# Patient Record
Sex: FEMALE | Race: WHITE | NOT HISPANIC OR LATINO | Employment: FULL TIME | URBAN - NONMETROPOLITAN AREA
[De-identification: names, ages, dates, MRNs, and addresses within clinical notes are randomized per-mention and may not be internally consistent; named-entity substitution may affect disease eponyms.]

---

## 2023-12-21 ENCOUNTER — OFFICE VISIT (OUTPATIENT)
Dept: FAMILY MEDICINE CLINIC | Facility: CLINIC | Age: 53
End: 2023-12-21
Payer: COMMERCIAL

## 2023-12-21 VITALS
WEIGHT: 284 LBS | DIASTOLIC BLOOD PRESSURE: 80 MMHG | HEART RATE: 75 BPM | OXYGEN SATURATION: 100 % | HEIGHT: 70 IN | SYSTOLIC BLOOD PRESSURE: 130 MMHG | BODY MASS INDEX: 40.66 KG/M2

## 2023-12-21 DIAGNOSIS — Z13.220 LIPID SCREENING: ICD-10-CM

## 2023-12-21 DIAGNOSIS — Z13.29 SCREENING FOR THYROID DISORDER: ICD-10-CM

## 2023-12-21 DIAGNOSIS — Z00.00 ENCOUNTER FOR MEDICAL EXAMINATION TO ESTABLISH CARE: Primary | ICD-10-CM

## 2023-12-21 DIAGNOSIS — Z13.1 SCREENING FOR DIABETES MELLITUS: ICD-10-CM

## 2023-12-21 DIAGNOSIS — E66.01 CLASS 3 SEVERE OBESITY DUE TO EXCESS CALORIES WITHOUT SERIOUS COMORBIDITY WITH BODY MASS INDEX (BMI) OF 40.0 TO 44.9 IN ADULT: ICD-10-CM

## 2023-12-21 DIAGNOSIS — Z11.59 NEED FOR HEPATITIS C SCREENING TEST: ICD-10-CM

## 2023-12-21 PROBLEM — E66.813 CLASS 3 SEVERE OBESITY DUE TO EXCESS CALORIES WITHOUT SERIOUS COMORBIDITY WITH BODY MASS INDEX (BMI) OF 40.0 TO 44.9 IN ADULT: Status: ACTIVE | Noted: 2023-12-21

## 2023-12-21 NOTE — PROGRESS NOTES
New Patient Office Visit      Date: 2023   Patient Name: Caity Hammond  : 1970   MRN: [id number]     Chief Complaint:    Chief Complaint   Patient presents with    Weight Loss    Establish Care       History of Present Illness: Caity Hammond is a 53 y.o. female who is here today to establish care.  She has not seen a provider in a long time.  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  She states that she has tried to eat healthy and exercise regularly, but she would like to see the status of her health.  She would also like to discuss weight loss.  She states that she has tried every single diet that she could find, but she only ever found minor success with very extreme diets.  With those, even if she had success, she was unable to maintain the weight loss.  She is states that she has also tried walking for weight loss and health, but she has not seen any improvements with that.  She states that she was as high as 318 pounds, but she has been able to get it down to where she is now.    Subjective      Review of Systems:   Review of Systems   Constitutional:  Positive for unexpected weight gain. Negative for unexpected weight loss.   Psychiatric/Behavioral:  Positive for sleep disturbance and stress. Negative for dysphoric mood and depressed mood. The patient is not nervous/anxious.        Past Medical History: History reviewed. No pertinent past medical history.    Past Surgical History:   Past Surgical History:   Procedure Laterality Date     SECTION      TUBAL ABDOMINAL LIGATION         Family History:   Family History   Problem Relation Age of Onset    Obesity Mother         had RU [place] surgery    Hyperlipidemia Mother     Diabetes Father         passed at 73    Hypertension Father     Alzheimer's disease Father     Hypertension Brother     Obesity Brother     Hyperlipidemia Brother   "   Alzheimer's disease Maternal Grandmother     Prostate cancer Maternal Grandfather     Cancer Paternal Grandfather 80 - 89       Social History:   Social History     Socioeconomic History    Marital status:    Tobacco Use    Smoking status: Never    Smokeless tobacco: Never   Vaping Use    Vaping Use: Never used   Substance and Sexual Activity    Drug use: Never    Sexual activity: Yes       Medications:     Current Outpatient Medications:     doxylamine (UNISOM) 25 MG tablet, Take 1 tablet by mouth At Night As Needed for Sleep., Disp: , Rfl:     Allergies:   No Known Allergies    Objective     Physical Exam:  Vital Signs:   Vitals:    12/21/23 1459   BP: 130/80   BP Location: Right arm   Patient Position: Sitting   Cuff Size: Large Adult   Pulse: 75   SpO2: 100%   Weight: 129 kg (284 lb)   Height: 177.8 cm (70\")     Body mass index is 40.75 kg/m².    Physical Exam  Vitals and nursing note reviewed.   Constitutional:       General: She is not in acute distress.     Appearance: Normal appearance. She is obese. She is not ill-appearing.   HENT:      Head: Normocephalic and atraumatic.   Cardiovascular:      Rate and Rhythm: Normal rate and regular rhythm.      Pulses: Normal pulses.      Heart sounds: Normal heart sounds.   Pulmonary:      Effort: Pulmonary effort is normal. No respiratory distress.      Breath sounds: Normal breath sounds.   Musculoskeletal:      Right lower leg: No edema.      Left lower leg: No edema.   Skin:     General: Skin is warm and dry.   Neurological:      General: No focal deficit present.      Mental Status: She is alert and oriented to person, place, and time.      Coordination: Coordination normal.      Gait: Gait normal.   Psychiatric:         Mood and Affect: Mood normal.         Behavior: Behavior normal.       Results:   PHQ-9 Total Score: 0      Assessment / Plan      Assessment/Plan:   Diagnoses and all orders for this visit:    1. Encounter for medical examination to " establish care (Primary)    2. Class 3 severe obesity due to excess calories without serious comorbidity with body mass index (BMI) of 40.0 to 44.9 in adult  Assessment & Plan:  Patient's (Body mass index is 40.75 kg/m².) indicates that they are morbidly/severely obese (BMI > 40 or > 35 with obesity - related health condition) with health conditions that include none . Weight is improving with lifestyle modifications. BMI  is above average; BMI management plan is completed. We discussed portion control, increasing exercise, and checking blood work to see if there other potential factors .     Orders:  -     Comprehensive Metabolic Panel; Future  -     CBC Auto Differential; Future  -     Magnesium; Future    3. Need for hepatitis C screening test  -     HCV Antibody Rfx To Qnt PCR; Future    4. Screening for thyroid disorder  -     Thyroid Cascade Profile; Future    5. Screening for diabetes mellitus  -     Hemoglobin A1c; Future    6. Lipid screening  -     Lipid Panel; Future         Follow Up:   Return for Annual Physical in 6 weeks.    Coleen Chu PA-C  [facility] Internal Medicine [facility]

## 2023-12-22 NOTE — ASSESSMENT & PLAN NOTE
Patient's (Body mass index is 40.75 kg/m².) indicates that they are morbidly/severely obese (BMI > 40 or > 35 with obesity - related health condition) with health conditions that include none . Weight is improving with lifestyle modifications. BMI  is above average; BMI management plan is completed. We discussed portion control, increasing exercise, and checking blood work to see if there other potential factors .

## 2024-01-04 ENCOUNTER — LAB (OUTPATIENT)
Dept: FAMILY MEDICINE CLINIC | Facility: CLINIC | Age: 54
End: 2024-01-04
Payer: COMMERCIAL

## 2024-01-04 DIAGNOSIS — Z13.220 LIPID SCREENING: ICD-10-CM

## 2024-01-04 DIAGNOSIS — Z13.29 SCREENING FOR THYROID DISORDER: ICD-10-CM

## 2024-01-04 DIAGNOSIS — Z11.59 NEED FOR HEPATITIS C SCREENING TEST: ICD-10-CM

## 2024-01-04 DIAGNOSIS — E66.01 CLASS 3 SEVERE OBESITY DUE TO EXCESS CALORIES WITHOUT SERIOUS COMORBIDITY WITH BODY MASS INDEX (BMI) OF 40.0 TO 44.9 IN ADULT: ICD-10-CM

## 2024-01-04 DIAGNOSIS — Z13.1 SCREENING FOR DIABETES MELLITUS: ICD-10-CM

## 2024-01-04 PROCEDURE — 36415 COLL VENOUS BLD VENIPUNCTURE: CPT | Performed by: PHYSICIAN ASSISTANT

## 2024-01-05 LAB
ALBUMIN SERPL-MCNC: 4.5 G/DL (ref 3.8–4.9)
ALBUMIN/GLOB SERPL: 1.7 {RATIO} (ref 1.2–2.2)
ALP SERPL-CCNC: 80 IU/L (ref 44–121)
ALT SERPL-CCNC: 25 IU/L (ref 0–32)
AST SERPL-CCNC: 23 IU/L (ref 0–40)
BASOPHILS # BLD AUTO: 0 X10E3/UL (ref 0–0.2)
BASOPHILS NFR BLD AUTO: 1 %
BILIRUB SERPL-MCNC: 0.6 MG/DL (ref 0–1.2)
BUN SERPL-MCNC: 15 MG/DL (ref 6–24)
BUN/CREAT SERPL: 24 (ref 9–23)
CALCIUM SERPL-MCNC: 9.9 MG/DL (ref 8.7–10.2)
CHLORIDE SERPL-SCNC: 102 MMOL/L (ref 96–106)
CHOLEST SERPL-MCNC: 261 MG/DL (ref 100–199)
CO2 SERPL-SCNC: 23 MMOL/L (ref 20–29)
CREAT SERPL-MCNC: 0.63 MG/DL (ref 0.57–1)
EGFRCR SERPLBLD CKD-EPI 2021: 107 ML/MIN/1.73
EOSINOPHIL # BLD AUTO: 0.1 X10E3/UL (ref 0–0.4)
EOSINOPHIL NFR BLD AUTO: 1 %
ERYTHROCYTE [DISTWIDTH] IN BLOOD BY AUTOMATED COUNT: 14.5 % (ref 11.7–15.4)
GLOBULIN SER CALC-MCNC: 2.6 G/DL (ref 1.5–4.5)
GLUCOSE SERPL-MCNC: 99 MG/DL (ref 70–99)
HBA1C MFR BLD: 5.9 % (ref 4.8–5.6)
HCT VFR BLD AUTO: 43.6 % (ref 34–46.6)
HCV AB SERPL QL IA: NORMAL
HCV IGG SERPL QL IA: NON REACTIVE
HDLC SERPL-MCNC: 71 MG/DL
HGB BLD-MCNC: 13.8 G/DL (ref 11.1–15.9)
IMM GRANULOCYTES # BLD AUTO: 0 X10E3/UL (ref 0–0.1)
IMM GRANULOCYTES NFR BLD AUTO: 0 %
LDLC SERPL CALC-MCNC: 172 MG/DL (ref 0–99)
LYMPHOCYTES # BLD AUTO: 1.7 X10E3/UL (ref 0.7–3.1)
LYMPHOCYTES NFR BLD AUTO: 29 %
MAGNESIUM SERPL-MCNC: 2.4 MG/DL (ref 1.6–2.3)
MCH RBC QN AUTO: 26.2 PG (ref 26.6–33)
MCHC RBC AUTO-ENTMCNC: 31.7 G/DL (ref 31.5–35.7)
MCV RBC AUTO: 83 FL (ref 79–97)
MONOCYTES # BLD AUTO: 0.5 X10E3/UL (ref 0.1–0.9)
MONOCYTES NFR BLD AUTO: 8 %
NEUTROPHILS # BLD AUTO: 3.6 X10E3/UL (ref 1.4–7)
NEUTROPHILS NFR BLD AUTO: 61 %
PLATELET # BLD AUTO: 272 X10E3/UL (ref 150–450)
POTASSIUM SERPL-SCNC: 4.7 MMOL/L (ref 3.5–5.2)
PROT SERPL-MCNC: 7.1 G/DL (ref 6–8.5)
RBC # BLD AUTO: 5.26 X10E6/UL (ref 3.77–5.28)
SODIUM SERPL-SCNC: 139 MMOL/L (ref 134–144)
TRIGL SERPL-MCNC: 104 MG/DL (ref 0–149)
TSH SERPL DL<=0.005 MIU/L-ACNC: 2.61 UIU/ML (ref 0.45–4.5)
VLDLC SERPL CALC-MCNC: 18 MG/DL (ref 5–40)
WBC # BLD AUTO: 5.9 X10E3/UL (ref 3.4–10.8)

## 2024-01-26 ENCOUNTER — TELEPHONE (OUTPATIENT)
Dept: FAMILY MEDICINE CLINIC | Facility: CLINIC | Age: 54
End: 2024-01-26
Payer: COMMERCIAL

## 2024-01-26 NOTE — TELEPHONE ENCOUNTER
Called/spoke to patient and let her know that we would not be able to transfer care to anyone until the new provider started.    She verbalized understanding and will keep her appt for next week.

## 2024-01-26 NOTE — TELEPHONE ENCOUNTER
Caller: Caity Hammond    Relationship: Self    Best call back number: [phone]    Who is your current provider: FELICITY HERNANDEZ    Who would you like your new provider to be: A DOCTOR AND NURSE PRACTITIONER     What are your reasons for transferring care: PT RATHER HAVE PCP THAT IS MORE EXPERIENCED

## 2024-02-01 ENCOUNTER — OFFICE VISIT (OUTPATIENT)
Dept: FAMILY MEDICINE CLINIC | Facility: CLINIC | Age: 54
End: 2024-02-01
Payer: COMMERCIAL

## 2024-02-01 VITALS
OXYGEN SATURATION: 97 % | DIASTOLIC BLOOD PRESSURE: 80 MMHG | BODY MASS INDEX: 41.59 KG/M2 | SYSTOLIC BLOOD PRESSURE: 130 MMHG | WEIGHT: 290.5 LBS | HEART RATE: 77 BPM | HEIGHT: 70 IN

## 2024-02-01 DIAGNOSIS — Z12.31 ENCOUNTER FOR SCREENING MAMMOGRAM FOR MALIGNANT NEOPLASM OF BREAST: ICD-10-CM

## 2024-02-01 DIAGNOSIS — Z12.11 ENCOUNTER FOR SCREENING FOR MALIGNANT NEOPLASM OF COLON: ICD-10-CM

## 2024-02-01 DIAGNOSIS — E66.01 CLASS 3 SEVERE OBESITY DUE TO EXCESS CALORIES WITH SERIOUS COMORBIDITY AND BODY MASS INDEX (BMI) OF 40.0 TO 44.9 IN ADULT: ICD-10-CM

## 2024-02-01 DIAGNOSIS — Z00.00 GENERAL MEDICAL EXAM: Primary | ICD-10-CM

## 2024-02-01 DIAGNOSIS — E78.2 MIXED HYPERLIPIDEMIA: ICD-10-CM

## 2024-02-01 DIAGNOSIS — R73.03 PREDIABETES: ICD-10-CM

## 2024-02-01 NOTE — PROGRESS NOTES
Female Physical Note      Date: 2024   Patient Name: Caity Hammond  : 1970   MRN: [id number]     Chief Complaint:    Chief Complaint   Patient presents with    Annual Exam       History of Present Illness: Caity Hammond is a 53 y.o. female who is here today for their annual health maintenance and physical.  She continues to be concerned about her weight, and she has still been unable to lose despite lifestyle changes.      Subjective      Review of Systems:   Review of Systems   Constitutional:  Positive for unexpected weight gain. Negative for unexpected weight loss.   Respiratory:  Negative for cough, chest tightness and shortness of breath.    Cardiovascular:  Negative for chest pain, palpitations and leg swelling.   Gastrointestinal:  Negative for abdominal pain, blood in stool, constipation, diarrhea, nausea, vomiting and indigestion.   Genitourinary:  Positive for amenorrhea (Almost 1 year).   Skin: Negative.    Psychiatric/Behavioral:  Positive for sleep disturbance and stress. Negative for dysphoric mood and depressed mood. The patient is not nervous/anxious.        Past Medical History, Social History, Family History and Care Team were all reviewed with patient and updated as appropriate.     Medications:     Current Outpatient Medications:     doxylamine (UNISOM) 25 MG tablet, Take 1 tablet by mouth At Night As Needed for Sleep., Disp: , Rfl:     Allergies:   No Known Allergies    Health Maintenance   Topic Date Due    MAMMOGRAM  Never done    COLORECTAL CANCER SCREENING  Never done    ANNUAL PHYSICAL  Never done    PAP SMEAR  Never done    ZOSTER VACCINE (1 of 2) 2024 (Originally 3/24/2021)    COVID-19 Vaccine (1) 2024 (Originally 1970)    INFLUENZA VACCINE  2024 (Originally 2023)    TDAP/TD VACCINES (1 - Tdap) 2025 (Originally 3/24/1990)    LIPID PANEL  2025    BMI FOLLOWUP  2025    HEPATITIS C SCREENING  Completed    Pneumococcal Vaccine  "0-64  Aged Out       Diet/Physical activity:  She has been eating salads for lunch, and she is prepping her foods as much as possible. She has been trying to limit carbs, and she has not had any snacks in the house.     She is currently exercising walking 30-45 minutes/day 3 days/week. She states active the other days a week.     Sexual Health: Denies concerns.     Intimate partner violence: Denies concerns.    Objective     Physical Exam:  Vital Signs:   Vitals:    02/01/24 1414   BP: 130/80   BP Location: Right arm   Patient Position: Sitting   Cuff Size: Large Adult   Pulse: 77   SpO2: 97%   Weight: 132 kg (290 lb 8 oz)   Height: 177.8 cm (70\")     Body mass index is 41.68 kg/m².     Physical Exam  Vitals and nursing note reviewed.   Constitutional:       General: She is not in acute distress.     Appearance: Normal appearance. She is obese. She is not ill-appearing.   HENT:      Head: Normocephalic and atraumatic.   Cardiovascular:      Rate and Rhythm: Normal rate and regular rhythm.      Pulses: Normal pulses.      Heart sounds: Normal heart sounds.   Pulmonary:      Effort: Pulmonary effort is normal. No respiratory distress.      Breath sounds: Normal breath sounds.   Musculoskeletal:      Right lower leg: No edema.      Left lower leg: No edema.   Skin:     General: Skin is warm and dry.   Neurological:      General: No focal deficit present.      Mental Status: She is alert and oriented to person, place, and time.      Coordination: Coordination normal.      Gait: Gait normal.   Psychiatric:         Mood and Affect: Mood normal.         Behavior: Behavior normal.       Assessment / Plan      Assessment/Plan:   Diagnoses and all orders for this visit:    1. General medical exam (Primary)  Comments:  She declines any immunizations.  Pap smear recommended, and she agrees to schedule herself.    2. Class 3 severe obesity due to excess calories with serious comorbidity and body mass index (BMI) of 40.0 to 44.9 " in adult  Assessment & Plan:  Patient's (Body mass index is 41.68 kg/m².) indicates that they are morbidly/severely obese (BMI > 40 or > 35 with obesity - related health condition) with health conditions that include dyslipidemias and prediabetes  . Weight is worsening. BMI  is above average; BMI management plan is completed. We discussed low calorie, low carb based diet program, portion control, increasing exercise, and pharmacologic options including injectables .  She will start program with her insurance for nutritional counseling, and we will initiate preferred medication.  We discussed the options during today's visit.      3. Mixed hyperlipidemia  Assessment & Plan:  Hyperlipidemia is chronic and not currently controlled.  Her HDL is even better than recommended, and her VLDL is very well-controlled.  She declines to take any statin therapy, so she is advised to continue with diet improvement. I would recommend a diet low in saturated fats to help decrease the elevated LDL cholesterol.  We will continue to monitor.      4. Prediabetes  Assessment & Plan:  Her hemoglobin A1c was in the prediabetic range on recent labs.  I recommended a diet low in sugar, carbohydrates, and saturated fats.  Increased exercise and decreased weight can help reduce chance of diabetes.  We will continue to monitor.      5. Encounter for screening for malignant neoplasm of colon  Comments:  She declines colonoscopy, but she is in agreement to do a Cologuard.  Orders:  -     Cologuard - Stool, Per Rectum; Future    6. Encounter for screening mammogram for malignant neoplasm of breast  Comments:  She has history of abnormal mammogram, but she has never had any malignancies.  Orders:  -     Mammo Screening Bilateral With CAD; Future        Follow Up:   Return in about 6 weeks (around 3/14/2024) for recheck.    Healthcare Maintenance:   Discussed injury prevention, diet and exercise, safe sexual practices, and screening for common  diseases. Encouraged use of sunscreen and seatbelts. Encouraged SBE, avoidance of tobacco, limiting alcohol, and yearly dental and eye exams.   Caity Hammond voices understanding and acceptance of this advice and will call back with any further questions or concerns. AVS with preventive healthcare tips printed for patient.     Coleen Chu PA-C  [facility] Internal Medicine [facility]

## 2024-02-02 NOTE — ASSESSMENT & PLAN NOTE
Her hemoglobin A1c was in the prediabetic range on recent labs.  I recommended a diet low in sugar, carbohydrates, and saturated fats.  Increased exercise and decreased weight can help reduce chance of diabetes.  We will continue to monitor.

## 2024-02-02 NOTE — ASSESSMENT & PLAN NOTE
Hyperlipidemia is chronic and not currently controlled.  Her HDL is even better than recommended, and her VLDL is very well-controlled.  She declines to take any statin therapy, so she is advised to continue with diet improvement. I would recommend a diet low in saturated fats to help decrease the elevated LDL cholesterol.  We will continue to monitor.

## 2024-02-02 NOTE — ASSESSMENT & PLAN NOTE
Patient's (Body mass index is 41.68 kg/m².) indicates that they are morbidly/severely obese (BMI > 40 or > 35 with obesity - related health condition) with health conditions that include dyslipidemias and prediabetes  . Weight is worsening. BMI  is above average; BMI management plan is completed. We discussed low calorie, low carb based diet program, portion control, increasing exercise, and pharmacologic options including injectables .  She will start program with her insurance for nutritional counseling, and we will initiate preferred medication.  We discussed the options during today's visit.

## 2024-03-18 ENCOUNTER — TRANSCRIBE ORDERS (OUTPATIENT)
Dept: ADMINISTRATIVE | Facility: HOSPITAL | Age: 54
End: 2024-03-18
Payer: COMMERCIAL

## 2024-03-18 DIAGNOSIS — Z12.31 VISIT FOR SCREENING MAMMOGRAM: Primary | ICD-10-CM

## 2024-04-05 ENCOUNTER — HOSPITAL ENCOUNTER (OUTPATIENT)
Dept: MAMMOGRAPHY | Facility: HOSPITAL | Age: 54
Discharge: HOME | End: 2024-04-05
Admitting: PHYSICIAN ASSISTANT
Payer: COMMERCIAL

## 2024-04-05 DIAGNOSIS — Z12.31 VISIT FOR SCREENING MAMMOGRAM: ICD-10-CM

## 2024-04-05 PROCEDURE — 77063 BREAST TOMOSYNTHESIS BI: CPT

## 2024-04-05 PROCEDURE — 77067 SCR MAMMO BI INCL CAD: CPT

## 2024-04-09 DIAGNOSIS — R92.8 ABNORMAL MAMMOGRAM: Primary | ICD-10-CM

## 2024-05-13 ENCOUNTER — HOSPITAL ENCOUNTER (OUTPATIENT)
Dept: ULTRASOUND IMAGING | Facility: HOSPITAL | Age: 54
Discharge: HOME | End: 2024-05-13
Payer: COMMERCIAL

## 2024-05-13 ENCOUNTER — HOSPITAL ENCOUNTER (OUTPATIENT)
Dept: MAMMOGRAPHY | Facility: HOSPITAL | Age: 54
Discharge: HOME | End: 2024-05-13
Payer: COMMERCIAL

## 2024-05-13 ENCOUNTER — TRANSCRIBE ORDERS (OUTPATIENT)
Dept: ADMINISTRATIVE | Facility: HOSPITAL | Age: 54
End: 2024-05-13
Payer: COMMERCIAL

## 2024-05-13 DIAGNOSIS — R92.8 ABNORMAL MAMMOGRAM: Primary | ICD-10-CM

## 2024-05-13 DIAGNOSIS — R92.8 ABNORMAL MAMMOGRAM: ICD-10-CM

## 2024-05-13 PROCEDURE — 76642 ULTRASOUND BREAST LIMITED: CPT

## 2024-05-13 PROCEDURE — 77061 BREAST TOMOSYNTHESIS UNI: CPT | Performed by: RADIOLOGY

## 2024-05-13 PROCEDURE — 77065 DX MAMMO INCL CAD UNI: CPT | Performed by: RADIOLOGY

## 2024-05-13 PROCEDURE — 76642 ULTRASOUND BREAST LIMITED: CPT | Performed by: RADIOLOGY

## 2024-05-13 PROCEDURE — G0279 TOMOSYNTHESIS, MAMMO: HCPCS

## 2024-05-13 PROCEDURE — 77065 DX MAMMO INCL CAD UNI: CPT

## 2024-05-24 ENCOUNTER — TELEPHONE (OUTPATIENT)
Dept: FAMILY MEDICINE CLINIC | Facility: CLINIC | Age: 54
End: 2024-05-24

## 2024-05-24 NOTE — TELEPHONE ENCOUNTER
Caller: Caity Hammond    Relationship: Self    Best call back number: [phone]     Who is your current provider: RAVINDER HERNANDEZ    Is your current provider offboarding? NO    Who would you like your new provider to be: DR WEISS    What are your reasons for transferring care: PT IS WANTING TO SEE A PHYSICIAN DUE TO SOME CURRENT FINDING WITH HER HEALTH.

## 2024-05-30 ENCOUNTER — OFFICE VISIT (OUTPATIENT)
Dept: FAMILY MEDICINE CLINIC | Facility: CLINIC | Age: 54
End: 2024-05-30
Payer: COMMERCIAL

## 2024-05-30 VITALS
WEIGHT: 293 LBS | DIASTOLIC BLOOD PRESSURE: 78 MMHG | OXYGEN SATURATION: 98 % | SYSTOLIC BLOOD PRESSURE: 126 MMHG | HEIGHT: 70 IN | BODY MASS INDEX: 41.95 KG/M2 | HEART RATE: 78 BPM

## 2024-05-30 DIAGNOSIS — E66.01 CLASS 3 SEVERE OBESITY DUE TO EXCESS CALORIES WITHOUT SERIOUS COMORBIDITY WITH BODY MASS INDEX (BMI) OF 40.0 TO 44.9 IN ADULT: Chronic | ICD-10-CM

## 2024-05-30 DIAGNOSIS — R73.03 PREDIABETES: Chronic | ICD-10-CM

## 2024-05-30 DIAGNOSIS — E78.2 MIXED HYPERLIPIDEMIA: Chronic | ICD-10-CM

## 2024-05-30 DIAGNOSIS — Z00.00 ANNUAL PHYSICAL EXAM: Primary | ICD-10-CM

## 2024-05-30 DIAGNOSIS — L98.9 SKIN LESION OF LEFT LEG: ICD-10-CM

## 2024-05-30 PROCEDURE — 99396 PREV VISIT EST AGE 40-64: CPT | Performed by: NURSE PRACTITIONER

## 2024-05-30 PROCEDURE — 99214 OFFICE O/P EST MOD 30 MIN: CPT | Performed by: NURSE PRACTITIONER

## 2024-05-30 NOTE — ASSESSMENT & PLAN NOTE
Patient reports she has a smooth, raised lesion on her lower left leg that has been darkening and growing in size for years.  She denies itching, burning, etc.  Reports that she can shave over it without it hurting or irritating it.  She has no other areas like this on her body.  We discussed a referral to dermatology for further evaluation, she declines at this time.  Reports that she just wanted it noted in her chart for future potential referrals.

## 2024-05-30 NOTE — PROGRESS NOTES
Annual Physical     Name: Caity Hammond    : 1970     MRN: [id number]     Chief Complaint  Establish Care (Pt is here to establish care), Weight Loss (Pt is wanting to discuss weight loss surgery ), and Abrasion (Pt has a circular spot on her left lower leg. Has been there for years but has started to change color and grow )    Subjective     History of Present Illness:  Caity Hammond is a 54 y.o. female who presents today for a health maintenance evaluation.    Social History  Tobacco Use: Low Risk  (2024)    Patient History     Smoking Tobacco Use: Never     Smokeless Tobacco Use: Never     Passive Exposure: Not on file     Social History     Socioeconomic History    Marital status:    Tobacco Use    Smoking status: Never    Smokeless tobacco: Never   Vaping Use    Vaping status: Never Used   Substance and Sexual Activity    Alcohol use: Never    Drug use: Never    Sexual activity: Yes     Partners: Male     Birth control/protection: None     Comment:  only       General History  Caity  does have regular dental visits. Last exam was in Dec 2023  She does not complain of vision problems. Last eye exam was in . Reports she got new bifocals prescription  Immunizations are not up to date. The patient needs the following immunizations: Zoster and COVID - patient declines these    Lifestyle  Caity  consumes  a combination of healthy and unhealthy foods .  She exercises never.    Reproductive Health  Caity  is postmenopausal.  She reports periods are nonexistent.  She is sexually active. Her contraceptive plan is no method.    Screening  Last pap was ?? - scheduled with GYN in July  Last Completed Pap Smear       This patient has no relevant Health Maintenance data.        . History of abnormal pap smear or family history of gyn cancer: none    Last mammogram was   Last Completed Mammogram            MAMMOGRAM (Every 2 Years) Next due on 2024  Mammo  Diagnostic Digital Tomosynthesis Right With CAD    04/05/2024  Mammo Screening Digital Tomosynthesis Bilateral With CAD                . Personal or family history of abnormal mammograms or breast cancer: none    Last colonoscopy was   Last Completed Colonoscopy            COLORECTAL CANCER SCREENING (COLOGUARD - Every 3 Years) Next due on 2/13/2027 02/13/2024  Cologuard component of Cologuard - Stool, Per Rectum                . Family history of colon cancer: paternal grandfather with colon cancer    **Maternal grandfather passed away with stage 4 prostate cancer    Last DEXA was never.    Health Maintenance Summary            Overdue - PAP SMEAR (Every 3 Years) Never done      No completion, postpone, or frequency change history exists for this topic.              Postponed - ZOSTER VACCINE (1 of 2) Postponed until 6/2/2024 06/02/2024  Postponed until 6/2/2024 by Radha Madden APRN (Patient Refused)    02/01/2024  Postponed until 2/1/2024 by Coleen Chu PA-C (Patient Refused)              Postponed - COVID-19 Vaccine (1 - 2023-24 season) Postponed until 6/4/2024 06/02/2024  Postponed until 6/4/2024 by Radha Madden APRN (Patient Refused)    02/01/2024  Postponed until 2/3/2024 by Coleen Chu PA-C (Patient Refused)              Postponed - TDAP/TD VACCINES (1 - Tdap) Postponed until 2/1/2025 02/01/2024  Postponed until 2/1/2025 by Coleen Chu PA-C (Patient Refused)              INFLUENZA VACCINE (Yearly - August to March) Next due on 8/1/2024 12/21/2023  Postponed until 3/31/2024 by Zohra Hermosillo MA (Patient Refused)              LIPID PANEL (Yearly) Next due on 1/4/2025 01/04/2024  Lipid Panel              ANNUAL PHYSICAL (Yearly) Next due on 2/1/2025 02/01/2024  Registry Metric: Last Annual Physical              BMI FOLLOWUP (Yearly) Next due on 5/30/2025 05/30/2024  SmartData: WORKFLOW - QUALITY  "MEASUREMENT - DOCUMENTED WEIGHT FOLLOW-UP PLAN    05/30/2024  SmartData: WORKFLOW - QUALITY MEASUREMENT - BMI FOLLOW UP CARE PLAN DOCUMENTED    02/01/2024  SmartData: WORKFLOW - QUALITY MEASUREMENT - DOCUMENTED WEIGHT FOLLOW-UP PLAN    02/01/2024  SmartData: WORKFLOW - QUALITY MEASUREMENT - BMI FOLLOW UP CARE PLAN DOCUMENTED    12/21/2023  SmartData: WORKFLOW - QUALITY MEASUREMENT - DOCUMENTED WEIGHT FOLLOW-UP PLAN    Only the first 5 history entries have been loaded, but more history exists.              MAMMOGRAM (Every 2 Years) Next due on 5/13/2026 05/13/2024  Mammo Diagnostic Digital Tomosynthesis Right With CAD    04/05/2024  Mammo Screening Digital Tomosynthesis Bilateral With CAD              COLORECTAL CANCER SCREENING (COLOGUARD - Every 3 Years) Next due on 2/13/2027 02/13/2024  Cologuard component of Cologuard - Stool, Per Rectum              HEPATITIS C SCREENING  Completed      01/04/2024  HCV Antibody Rfx To Qnt PCR              Pneumococcal Vaccine 0-64 (Series Information) Aged Out      No completion, postpone, or frequency change history exists for this topic.                    There is no immunization history on file for this patient.    Review of Systems    Objective     Vital Signs  /78 (BP Location: Left arm, Patient Position: Sitting)   Pulse 78   Ht 177.8 cm (70\")   Wt (!) 138 kg (304 lb 9.6 oz)   SpO2 98%   BMI 43.71 kg/m²   Estimated body mass index is 43.71 kg/m² as calculated from the following:    Height as of this encounter: 177.8 cm (70\").    Weight as of this encounter: 138 kg (304 lb 9.6 oz).        Physical Exam  Vitals reviewed.   Constitutional:       Appearance: Normal appearance.   Cardiovascular:      Rate and Rhythm: Normal rate and regular rhythm.      Heart sounds: Normal heart sounds.   Pulmonary:      Effort: Pulmonary effort is normal.      Breath sounds: Normal breath sounds.   Skin:     General: Skin is warm and dry.      Capillary Refill: Capillary " refill takes less than 2 seconds.      Findings: Lesion (Slightly raised, pigmented slightly darker than skin tone, does not andrew, smooth, anterior left lower leg.) present.          Neurological:      General: No focal deficit present.      Mental Status: She is alert and oriented to person, place, and time.   Psychiatric:         Mood and Affect: Mood normal.         Behavior: Behavior normal.          Assessment and Plan     Diagnoses and all orders for this visit:    1. Annual physical exam (Primary)  Assessment & Plan:  Overall doing well  , 2 young-adult children  Works at [place] [employer]    Patient is transferring care from her previous PCP.  She had labs drawn in January, would like to hold off on having labs drawn today.  Her cholesterol levels were only slightly abnormal, she is not on any medications.  She has been diagnosed with prediabetes in the past, she does try to watch her diet for carbs.      2. Class 3 severe obesity due to excess calories without serious comorbidity with body mass index (BMI) of 40.0 to 44.9 in adult  Assessment & Plan:  Patient's (Body mass index is 43.71 kg/m².) indicates that they are morbidly/severely obese (BMI > 40 or > 35 with obesity - related health condition) with health conditions that include dyslipidemias and prediabetes  . Weight is worsening. BMI  is above average; BMI management plan is completed. We discussed low calorie, low carb based diet program, portion control, increasing exercise, and consulting a Bariatric surgeon per patient's request.    Patient reports she has yoyoed within a 50 pound range for years.  She has taken multiple different medications to assist with weight loss, she always gains her weight back when she stops what ever intervention she is doing.  She has considered weight loss surgery for a while now, has been looking into and reading up about this.  Her mother had Pao-en-Y surgery 8 years ago, is doing well.  Her mother has  encouraged her to seek bariatric surgery, her  is very leery of this at this time.  We discussed risks, benefits, potential side effects.  We discussed the need for a supportive family unit.  Patient is understanding of all of these things, she would like to move forward with referral to bariatric surgery.  Referral order placed today.    Orders:  -     Ambulatory Referral to Bariatric Surgery    3. Prediabetes  Assessment & Plan:  Patient had labs drawn by previous PCP in January of this year, she is not currently on any medications.    Orders:  -     Ambulatory Referral to Bariatric Surgery    4. Mixed hyperlipidemia  Assessment & Plan:  Patient had labs drawn by previous PCP in January of this year, she is not currently on any medications.    Orders:  -     Ambulatory Referral to Bariatric Surgery    5. Skin lesion of left leg  Assessment & Plan:  Patient reports she has a smooth, raised lesion on her lower left leg that has been darkening and growing in size for years.  She denies itching, burning, etc.  Reports that she can shave over it without it hurting or irritating it.  She has no other areas like this on her body.  We discussed a referral to dermatology for further evaluation, she declines at this time.  Reports that she just wanted it noted in her chart for future potential referrals.          Follow Up  Return in about 1 year (around 5/30/2025) for Annual physical.    JUAN Hazel

## 2024-05-30 NOTE — ASSESSMENT & PLAN NOTE
Overall doing well  , 2 young-adult children  Works at [employer]    Patient is transferring care from her previous PCP.  She had labs drawn in January, would like to hold off on having labs drawn today.  Her cholesterol levels were only slightly abnormal, she is not on any medications.  She has been diagnosed with prediabetes in the past, she does try to watch her diet for carbs.

## 2024-06-02 PROBLEM — E66.813 CLASS 3 SEVERE OBESITY DUE TO EXCESS CALORIES WITHOUT SERIOUS COMORBIDITY WITH BODY MASS INDEX (BMI) OF 40.0 TO 44.9 IN ADULT: Chronic | Status: ACTIVE | Noted: 2023-12-21

## 2024-06-02 PROBLEM — E78.2 MIXED HYPERLIPIDEMIA: Chronic | Status: ACTIVE | Noted: 2024-02-01

## 2024-06-02 PROBLEM — E66.01 CLASS 3 SEVERE OBESITY DUE TO EXCESS CALORIES WITHOUT SERIOUS COMORBIDITY WITH BODY MASS INDEX (BMI) OF 40.0 TO 44.9 IN ADULT: Chronic | Status: ACTIVE | Noted: 2023-12-21

## 2024-06-02 PROBLEM — R73.03 PREDIABETES: Chronic | Status: ACTIVE | Noted: 2024-02-01

## 2024-06-02 NOTE — ASSESSMENT & PLAN NOTE
Patient had labs drawn by previous PCP in January of this year, she is not currently on any medications.

## 2024-06-02 NOTE — ASSESSMENT & PLAN NOTE
Patient's (Body mass index is 43.71 kg/m².) indicates that they are morbidly/severely obese (BMI > 40 or > 35 with obesity - related health condition) with health conditions that include dyslipidemias and prediabetes  . Weight is worsening. BMI  is above average; BMI management plan is completed. We discussed low calorie, low carb based diet program, portion control, increasing exercise, and consulting a Bariatric surgeon per patient's request.    Patient reports she has yoyoed within a 50 pound range for years.  She has taken multiple different medications to assist with weight loss, she always gains her weight back when she stops what ever intervention she is doing.  She has considered weight loss surgery for a while now, has been looking into and reading up about this.  Her mother had Pao-en-Y surgery 8 years ago, is doing well.  Her mother has encouraged her to seek bariatric surgery, her  is very leery of this at this time.  We discussed risks, benefits, potential side effects.  We discussed the need for a supportive family unit.  Patient is understanding of all of these things, she would like to move forward with referral to bariatric surgery.  Referral order placed today.

## 2024-06-17 ENCOUNTER — HOSPITAL ENCOUNTER (OUTPATIENT)
Dept: ULTRASOUND IMAGING | Facility: HOSPITAL | Age: 54
Discharge: HOME | End: 2024-06-17
Payer: COMMERCIAL

## 2024-06-17 ENCOUNTER — HOSPITAL ENCOUNTER (OUTPATIENT)
Dept: MAMMOGRAPHY | Facility: HOSPITAL | Age: 54
Discharge: HOME | End: 2024-06-17
Payer: COMMERCIAL

## 2024-06-17 DIAGNOSIS — R92.8 ABNORMAL MAMMOGRAM: ICD-10-CM

## 2024-06-17 PROCEDURE — A4648 IMPLANTABLE TISSUE MARKER: HCPCS

## 2024-06-17 PROCEDURE — 88305 TISSUE EXAM BY PATHOLOGIST: CPT | Performed by: PHYSICIAN ASSISTANT

## 2024-06-17 PROCEDURE — 25010000002 LIDOCAINE 1 % SOLUTION: Performed by: RADIOLOGY

## 2024-06-17 RX ORDER — LIDOCAINE HYDROCHLORIDE AND EPINEPHRINE 10; 10 MG/ML; UG/ML
10 INJECTION, SOLUTION INFILTRATION; PERINEURAL ONCE
Status: COMPLETED | OUTPATIENT
Start: 2024-06-17 | End: 2024-06-17

## 2024-06-17 RX ORDER — LIDOCAINE HYDROCHLORIDE 10 MG/ML
5 INJECTION, SOLUTION INFILTRATION; PERINEURAL ONCE
Status: COMPLETED | OUTPATIENT
Start: 2024-06-17 | End: 2024-06-17

## 2024-06-17 RX ADMIN — LIDOCAINE HYDROCHLORIDE,EPINEPHRINE BITARTRATE 10 ML: 10; .01 INJECTION, SOLUTION INFILTRATION; PERINEURAL at 15:38

## 2024-06-17 RX ADMIN — Medication 3 ML: at 15:36

## 2024-06-17 NOTE — PROGRESS NOTES
Alert and oriented. Denies discomfort, no active bleeding, steri-strips not visualized, gauze dressing intact.  Cold packs given.  Verbalizes and demonstrates understanding of post-care instructions, written copy given. Questions answered, support given.

## 2024-06-19 LAB
CYTO UR: NORMAL
LAB AP CASE REPORT: NORMAL
LAB AP CLINICAL INFORMATION: NORMAL
PATH REPORT.FINAL DX SPEC: NORMAL
PATH REPORT.GROSS SPEC: NORMAL

## 2024-06-20 ENCOUNTER — TELEPHONE (OUTPATIENT)
Dept: MAMMOGRAPHY | Facility: HOSPITAL | Age: 54
End: 2024-06-20
Payer: COMMERCIAL

## 2024-06-20 NOTE — TELEPHONE ENCOUNTER
Patient notified of pathology results and recommendation. Verbalizes understanding. States having some discomfort, using analgesics with relief. Denies signs and symptoms of infection. Questions answered, support given, verbalized understanding. Verified patient has [facility] scheduling number to call and schedule recommended follow-up.

## 2024-07-10 RX ORDER — MULTIPLE VITAMINS W/ MINERALS TAB 9MG-400MCG
1 TAB ORAL DAILY
COMMUNITY

## 2024-07-11 ENCOUNTER — DOCUMENTATION (OUTPATIENT)
Dept: BARIATRICS/WEIGHT MGMT | Facility: CLINIC | Age: 54
End: 2024-07-11
Payer: COMMERCIAL

## 2024-07-11 ENCOUNTER — OFFICE VISIT (OUTPATIENT)
Dept: BARIATRICS/WEIGHT MGMT | Facility: CLINIC | Age: 54
End: 2024-07-11
Payer: COMMERCIAL

## 2024-07-11 ENCOUNTER — OFFICE VISIT (OUTPATIENT)
Dept: BEHAVIORAL HEALTH | Facility: CLINIC | Age: 54
End: 2024-07-11
Payer: COMMERCIAL

## 2024-07-11 VITALS
RESPIRATION RATE: 16 BRPM | OXYGEN SATURATION: 99 % | HEART RATE: 77 BPM | BODY MASS INDEX: 43.4 KG/M2 | SYSTOLIC BLOOD PRESSURE: 126 MMHG | DIASTOLIC BLOOD PRESSURE: 74 MMHG | HEIGHT: 69 IN | TEMPERATURE: 97.3 F | WEIGHT: 293 LBS

## 2024-07-11 DIAGNOSIS — K21.9 GASTROESOPHAGEAL REFLUX DISEASE, UNSPECIFIED WHETHER ESOPHAGITIS PRESENT: ICD-10-CM

## 2024-07-11 DIAGNOSIS — E66.01 OBESITY, CLASS III, BMI 40-49.9 (MORBID OBESITY): Primary | ICD-10-CM

## 2024-07-11 DIAGNOSIS — E66.01 MORBID OBESITY WITH BODY MASS INDEX (BMI) OF 40.0 TO 49.9: Primary | ICD-10-CM

## 2024-07-11 DIAGNOSIS — R10.13 DYSPEPSIA: ICD-10-CM

## 2024-07-11 DIAGNOSIS — R53.83 FATIGUE, UNSPECIFIED TYPE: ICD-10-CM

## 2024-07-11 DIAGNOSIS — Z71.89 ENCOUNTER FOR PSYCHOLOGICAL ASSESSMENT PRIOR TO BARIATRIC SURGERY: ICD-10-CM

## 2024-07-11 DIAGNOSIS — R73.03 PREDIABETES: Chronic | ICD-10-CM

## 2024-07-11 DIAGNOSIS — E78.2 MIXED HYPERLIPIDEMIA: Chronic | ICD-10-CM

## 2024-07-11 RX ORDER — VITAMIN B COMPLEX
CAPSULE ORAL DAILY
COMMUNITY

## 2024-07-11 RX ORDER — OMEPRAZOLE 20 MG/1
20 CAPSULE, DELAYED RELEASE ORAL AS NEEDED
COMMUNITY

## 2024-07-11 RX ORDER — MAGNESIUM OXIDE 400 MG/1
400 TABLET ORAL DAILY
COMMUNITY

## 2024-07-11 NOTE — PROGRESS NOTES
PROGRESS NOTE    Data:    Caity Hammond is a 54 y.o. female who met with the undersigned for a scheduled psychological evaluation from 12:45 - 1:25 pm.      Clinical Maneuvering/Intervention:      Chief complaint and history of presenting illness/Problems: struggling with obesity for several years. Despite trying different weight loss plans and diets, the pt reported being unsuccessful in losing weight. A psychological evaluation was conducted in order to assess past and current level of functioning. Areas assessed included, but were not limited to: perception of social support, perception of ability to face and deal with challenges in life (positive functioning), anxiety symptoms, depressive symptoms, perspective on beliefs/belief system, coping skills for stress, intelligence level, addiction issues, etc. Therapeutic rapport was established. Interventions conducted today were geared towards assessing the pt's readiness for weight loss surgery and identifying and psychological contraindications for undergoing such a major life change. Social support was deemed strong (specific to weight loss surgery/weight loss in this manner and in a general sense): mother, , children, and friends. Current psychological struggles were described as low, but included frustrations with being overweight. She did not endorse having symptoms of different major psychiatric disorder and talked about having a history of good mental health. Coping skills for distress and related to undergoing a major life change such as weight loss surgery/weight loss were deemed strong and included choosing to see good in life, keeping strong gloria in God, finds humor in things, makes a point to do quality work, tends to be a responsible person, maintains quality relationships with others, and makes a point to learn what will help her be successful with weight loss surgery. The pt endorsed having characteristics of readiness to undergo major  life changes inherent in the journey of weight loss surgery. She could speak to having 'suffered enough,' and the decision to have weight loss surgery is one she feels good about. The pt expressed gratitude for today's visit.     Past Family and Social History:      History of family mental health problems:  father (Alzheimer's disease)    Psychosocial history: treatment of psychiatric care in the past (N/A), alcohol/substance abuse treatment in the past (N/A) , alcohol/substance abuse problems (N/A), inpatient psychiatric care (N/A).    Mental Status Exam (MSE):  Hygiene:  good  Dress: normal  Attitude:  cooperative and proactive  Motor Activity: normal  Speech: normal  Mood:  excited  Affect:  congruent  Thought Processes: normal  Thought Content:  normal  Suicidal Thoughts:  not endorsed  Homicidal Thoughts:  not endorsed  Crisis Safety Plan: not needed   Hallucinations:  none      Patient's Support Network Includes:  family, friends      Progress toward goal: there is evidence to suggest that she is taking measures to improve the quality of her life including seeking weight loss surgery      Functional Status: high      Prognosis: good with weight loss surgery    Evaluation, Diagnoses, and Ability/Capacity to Respond to Treatment:      The pt presented to be struggling with frustrations with being overweight (BMI = 43.7, morbid obesity). The pt presented as quite positive and will good mental health. Results of MSE demonstrated a functional status of high. Strengths: belief in self that she will be successful with weight loss surgery, etc (see detailed list of coping skills above). Needed for growth (CPT code requirement for Weaknesses): weight loss.      From a psychological standpoint, the pt presents as a very good candidate for bariatric surgery. She is motivated for the surgery, has showed readiness for the lifestyle change in terms of starting to adjust her eating habits, and seems to have appropriate  expectations of how to prepare and how to live after surgery in order to lose weight successfully.    Treatment Plan:      Short term goals: Start improving her health by following up with her bariatric surgeon in order to receive weight loss surgery as soon as feasible/appropriate and demonstrate success with compliance to adhering to the recommended diet. Long term goals: reach a healthy weight and experience overall improved mood via taking control over her health.    Marjorie Cyr, PhD, LP

## 2024-07-11 NOTE — PROGRESS NOTES
[facility]  [address]  [address]  [facility] [address]  [phone]      Patient  Name:  Caity Hammond  :  1970      Date of Visit: 2024      Chief Complaint:  weight gain; unable to maintain weight loss      History of Present Illness:  Caity Hammond is a 54 y.o. female who presents today for evaluation, education and consultation regarding metabolic and bariatric surgery with Dr. Augustine.      Caity has been overweight for at least 47 years, has been 35 pounds or more overweight for at least 40 years, has been 100 pounds or more overweight for 20 or more years and started dieting at age 8.  Previous diet attempts include: Lázaro Cantrell, High Protein, Low Carbohydrate, Low Fat, Calorie Counting, Arnav's Diet, Cabbage Soup, Fasting, and Slim Fast; Diet Center, Weight Watchers, and Noom; Fen-Phen for 12 months, Dexatrim, Phenteramine, and Semaglutide.  The most weight Caity lost was 67 pounds but was unable to maintain that weight loss.        Complete history has been obtained and discussed today, as pertinent to metabolic/ bariatric surgery.     Past Medical History:   Diagnosis Date    Dyspepsia     Fatigue     GERD (gastroesophageal reflux disease)     prn PPI, no recent eval    Gout     Hyperlipidemia     Morbid obesity     Peptic ulceration     UGI bleed @age 6    Postoperative nausea     Prediabetes     RLS (restless legs syndrome)      Past Surgical History:   Procedure Laterality Date     SECTION       SECTION WITH TUBAL  2005    WISDOM TOOTH EXTRACTION Bilateral        Allergies   Allergen Reactions    Latex Rash       Current Outpatient Medications:     B Complex Vitamins (vitamin b complex) capsule capsule, Take  by mouth Daily., Disp: , Rfl:     Berberine Chloride (BERBERINE HCI PO), Take  by mouth., Disp: , Rfl:     doxylamine (UNISOM) 25 MG tablet, Take 1 tablet by mouth At Night As Needed for  Sleep., Disp: , Rfl:     magnesium oxide (MAG-OX) 400 MG tablet, Take 1 tablet by mouth Daily., Disp: , Rfl:     multivitamin with minerals tablet tablet, Take 1 tablet by mouth Daily., Disp: , Rfl:     omeprazole (priLOSEC) 20 MG capsule, Take 1 capsule by mouth As Needed., Disp: , Rfl:     Social History     Socioeconomic History    Marital status:    Tobacco Use    Smoking status: Never    Smokeless tobacco: Never   Vaping Use    Vaping status: Never Used   Substance and Sexual Activity    Alcohol use: Never    Drug use: Never    Sexual activity: Yes     Partners: Male     Birth control/protection: None     Comment:  only     Social History     Social History Narrative    Lives in [place], [place].   w/ 2 adult kids.  Works for [place] [employer] -  / .        Family History   Problem Relation Age of Onset    Obesity Mother         had gastric bypass surgery    Hyperlipidemia Mother     Hypertension Mother     Diabetes type II Father 55        passed at 73    Hypertension Father     Alzheimer's disease Father     Kidney disease Father         agent orange [place]    Heart failure Father     Sleep apnea Father     Hypertension Brother     Obesity Brother     Hyperlipidemia Brother     Sleep apnea Brother     Alzheimer's disease Maternal Grandmother     Stroke Maternal Grandmother     Prostate cancer Maternal Grandfather 93    Skin cancer Maternal Grandfather     Obesity Paternal Grandmother     Hypertension Paternal Grandmother     Alzheimer's disease Paternal Grandmother     Prostate cancer Paternal Grandfather 87    Obesity Paternal Grandfather     Ovarian cancer Neg Hx     Breast cancer Neg Hx        Review of Systems:  Constitutional:  reports fatigue, weight gain and denies fevers, chills.  HEENT:  denies headache, ear pain or loss of hearing, blurred or double vision, nasal discharge or sore throat.  Cardiovascular:   denies hx heart disease, chest pain, palpitations, hx  DVT.  Respiratory:   denies cough , wheezing, sleep apnea, asthma, hx PE.  Gastrointestinal:  reports heartburn and denies dysphagia, nausea, vomiting, abdominal pain, gallbladder issues, liver disease.  Genitourinary:   denies history of  frequent UTI, hematuria, dysuria, polyuria, polydipsia, renal insufficiency.    Musculoskeletal:  denies fibromyalgia, arthritis, and autoimmune disease.  Neurological:   denies numbness /tingling, dizziness, confusion, seizure, stroke.  Psychiatric:  denies depressed mood, feeling anxious, bipolar disorder.  Endocrine:  denies diabetes, thyroid disease.  Hematologic:  denies bruising, bleeding disorder, hx anemia, hx blood transfusion.  Skin:   denies rashes, hx MRSA.    Physical Exam:  Vital Signs:  Weight: (!) 141 kg (309 lb 12.8 oz)   Body mass index is 46.42 kg/m².  Temp: 97.3 °F (36.3 °C)   Heart Rate: 77   BP: 126/74     Physical Exam  Vitals reviewed.   Constitutional:       Appearance: She is well-developed.   HENT:      Head: Normocephalic and atraumatic.   Eyes:      General: No scleral icterus.     Conjunctiva/sclera: Conjunctivae normal.   Neck:      Thyroid: No thyromegaly.   Cardiovascular:      Rate and Rhythm: Normal rate and regular rhythm.      Heart sounds: No murmur heard.  Pulmonary:      Effort: Pulmonary effort is normal. No respiratory distress.      Breath sounds: Normal breath sounds. No wheezing or rales.   Abdominal:      General: Bowel sounds are normal. There is no distension.      Palpations: Abdomen is soft. There is no mass.      Tenderness: There is no abdominal tenderness.      Hernia: No hernia is present.      Comments: Scars: Csection   Musculoskeletal:         General: Normal range of motion.      Cervical back: Neck supple.   Skin:     General: Skin is warm and dry.      Findings: No rash.   Neurological:      Mental Status: She is alert and oriented to person, place, and time.      Gait: Gait normal.   Psychiatric:         Judgment:  Judgment normal.         Patient Active Problem List   Diagnosis    Morbid obesity    Mixed hyperlipidemia    Prediabetes    Annual physical exam    Skin lesion of left leg    Postoperative nausea    Fatigue    Gout    Dyspepsia    RLS (restless legs syndrome)    Peptic ulceration    GERD (gastroesophageal reflux disease)       Assessment:  54 y.o. female with medically complicated obesity pursuing sleeve gastrectomy.    Metabolic & Bariatric Surgery is deemed medically necessary given the following:  Class 3 Severe Obesity (BMI >=40). Obesity-related health conditions include the following:  prediabetes, hyperlipidemia, and GERD . Obesity is worsening. BMI is is above average; BMI management plan is completed. We discussed consulting a Bariatric surgeon.          Plan:  Further evaluation will include: CBC, CMP, Lipids, TSH, HgA1C, H.Pylori UBT, and EGD.    Additional clearances needed prior to surgery will include: Cardiology.     Patient understands that bariatric surgery is not cosmetic surgery but rather a tool to help make a lifelong commitment to lifestyle changes including diet, exercise and behavior modifications.  The patient has been educated today on those expected postoperative lifestyle changes.  Psychological and Nutritional consultations will be completed prior to surgery.  Instructions on how to access [employer] (an internet based site w/ educational surgical videos) were given to the patient.  Recommended perioperative vitamin supplementation was reviewed.  The importance of avoiding ASA/ NSAIDS/ steroids/ tobacco/nicotine/ hormones/ immunomodulators perioperatively was discussed in detail.  All questions/concerns have been addressed.      Further input to follow pending the above.           JON Estevez

## 2024-07-11 NOTE — PROGRESS NOTES
"Weight Loss Surgery  Presurgical Nutrition Assessment     Caity Hammond  07/11/2024  [id number]  [id number]  1970   female    Surgery desired: LSG (sleeve gastrectomy)     HEIGHT: 174 cm (68.5\")   WEIGHT: 141 kg (309.5 #)   BMI: 46.42    Highest weight ever: unknown      Allergies   Allergen Reactions    Latex Rash    Wound Dressing Adhesive Rash       Current Outpatient Medications:     multivitamin with minerals tablet tablet, Take 1 tablet by mouth Daily., Disp: , Rfl:       Subjective information: Met with patient and her mother, who they reported had undergone RNY bypass by Dr Rodríguez many years ago. She continues to follow bariatric diet and is faithful with exercise also. She is committed, she states, in supporting patient throughout the process of her surgery.  Patient has participated in WW, Deng, the [employer] program offered through her insurance, which was designed to help patients prevent diabetes. Patient capably states principles of healthful eating for weight management.       Nutrition Recall   Example of Usual 24 hour intake:     Breakfast: [employer] 3X per week when she travels out of town for work: fitzpatrick-egg-cheese biscuit OR egg mcMufffin OR at home 2 boiled eggs + sausage OR a protein bar     Lunch: salad topped with chicken OR leftovers OR a hamburger OR chicken sandwich OR a Maria Alejandra salad kit from grocery store with canned chicken    Dinner: tacos, spaghetti with meat sauce, beef with vegetables    Snacks:protein bar, chips or popcorn, or ice cream.   Or protein beverage run thru a [employer] to make a frozen dessert    Beverages of Choice: water or water with Oliver's sugar-free fruit flavoring. Dislikes coffee.  Drinks decaffeinated tea sweetened with Stevia.  Gave up all regular or diet soda approximately 4 years ago.     Food Allergies or Intolerances: none stated or recorded          Exercise / Activity: Has been trying to work in walking.  Patient and entire extended family " (children, mother, ) have recently joined the [employer] fitness center.       Assessment of Nutritional Adequacy, Excessive Intake or Deficiencies:        Protein intake is approaches or meets adequacy to ensure successful weight loss                                        Processed / simple Carbohydrate intake is: excessive - restaurant items and ice cream                                       Balance of diet with a variety of fruits and vegetables is: reasonable - good                                                        Reliance on restaurant food including fast food is: excessive                                                                          Ingestion of sweet beverages eg soda, sweet tea, fruit juice: not a problem      Education    Provided Nutrition Guidelines for Bariatric and Metabolic Surgery   Reviewed guidelines for higher protein, limited carbohydrate diet to promote weight loss. Demonstrated means of counting protein and carbohydrate grams.   Educated patient to wisely choose an appropriate protein supplement beverage for the post-surgery liquid diet.  Provided product guidelines and examples.    Explained importance of goal setting to help in changing eating behaviors that are not conducive to weight loss.  Specific macronutrient goals as below.   Provided follow-up options for support, including contact information for dietitians here.     Discussed importance of tracking grams of protein and carbohydrate in diet.  Web-based support information and apps for smart phones and computers given.        Nutrition Goals   Protein goal: 100 grams per day in three regular balanced meals and two to three high protein snacks each day, to ensure desired weight loss.   Carbohydrate goal:  100-140 grams per day  Beverage goal: Appropriate non-carbonated beverage intake.  Patient to wean self off of any sweet beverages, including soda.    Exercise Goals  Continue current exercise routine, if  appropriate, and obtain approval from caregiver if physically limited for any reason.   Start activity plan per PCP/specialist advice if not currently exercising.     Recommend that team proceed with surgery and follow per protocol.   Sheree Davalos RD  07/11/2024  14:27 EDT

## 2024-07-12 ENCOUNTER — TELEPHONE (OUTPATIENT)
Dept: FAMILY MEDICINE CLINIC | Facility: CLINIC | Age: 54
End: 2024-07-12
Payer: COMMERCIAL

## 2024-07-12 LAB — UREA BREATH TEST QL: NEGATIVE

## 2024-07-12 NOTE — TELEPHONE ENCOUNTER
Caller: Manish Caity Bangura    Relationship: Self    Best call back number: [phone]     What form or medical record are you requesting: PAST WEIGHT LOSS ATTEMPT LETTER TO BARIATRIC SURGEON - MD PREETI VIDAL     Who is requesting this form or medical record from you: [facility]     How would you like to receive the form or medical records (pick-up, mail, fax): UPLOAD TO [employer]     Timeframe paperwork needed: ASAP     Additional notes: PATIENT STATES SHE IS NEEDING A LETTER STATING SHE HAS TRIED DIETS AND THEY HAVE NOT WORKED FOR WEIGHT LOSS. SHE STATES THEY GAVE HER AN EXAMPLE LETTER IF NEEDED BY JUAN ROLDAN.     PLEASE CALL WITH UPDATES.

## 2024-07-16 NOTE — TELEPHONE ENCOUNTER
LVM for Pt stating to call us back regarding the letter and to have more specifics so cy can write it

## 2024-07-16 NOTE — TELEPHONE ENCOUNTER
Pt has contacted us back and states she will be sending a [employer] message that includes the letter example and treatments she has tried previously

## 2024-07-25 ENCOUNTER — OUTSIDE FACILITY SERVICE (OUTPATIENT)
Dept: BARIATRICS/WEIGHT MGMT | Facility: CLINIC | Age: 54
End: 2024-07-25
Payer: COMMERCIAL

## 2024-07-25 ENCOUNTER — LAB REQUISITION (OUTPATIENT)
Dept: LAB | Facility: HOSPITAL | Age: 54
End: 2024-07-25
Payer: COMMERCIAL

## 2024-07-25 DIAGNOSIS — K21.9 GASTRO-ESOPHAGEAL REFLUX DISEASE WITHOUT ESOPHAGITIS: ICD-10-CM

## 2024-07-25 PROCEDURE — 88305 TISSUE EXAM BY PATHOLOGIST: CPT | Performed by: SURGERY

## 2024-07-29 DIAGNOSIS — K21.9 GASTROESOPHAGEAL REFLUX DISEASE, UNSPECIFIED WHETHER ESOPHAGITIS PRESENT: ICD-10-CM

## 2024-07-30 NOTE — PROGRESS NOTES
[facility] Cardiology  [address], [address]  [place], [place], [address] [phone]  WWW.[facility]           OUTPATIENT CLINIC CONSULTATION NOTE    Patient care team:  Patient Care Team:  Radha Madden APRN as PCP - General (Family Medicine)    Requesting Provider and Reason for consultation: The patient is being seen today at the request of JON Estevez for preoperative cardiac risk assessment.     Subjective:   Chief complaint:   Chief Complaint   Patient presents with    cardiac clearance         Caity Hammond is a 54 y.o. female.  Cardiac focused problem list:  Hyperlipidemia   History of Fen Phen use; echo normal thereafter, 1990s  Prediabetes   Morbid obesity   GERD  Gout     HPI:    Patient presents today in consultation for preoperative cardiac risk assessment.     Walks 30 minutes at a time, approximately 1.5 miles, 3 times a week, without cardiopulmonary symptoms    Had an echocardiogram in the late 90s, reportedly unremarkable      Review of Systems:  As noted above in the HPI    PFSH:  Patient Active Problem List   Diagnosis    Morbid obesity    Mixed hyperlipidemia    Prediabetes    Annual physical exam    Skin lesion of left leg    Postoperative nausea    Fatigue    Gout    Dyspepsia    RLS (restless legs syndrome)    Peptic ulceration    GERD (gastroesophageal reflux disease)         Current Outpatient Medications:     B Complex Vitamins (vitamin b complex) capsule capsule, Take  by mouth Daily., Disp: , Rfl:     Berberine Chloride (BERBERINE HCI PO), Take  by mouth., Disp: , Rfl:     doxylamine (UNISOM) 25 MG tablet, Take 1 tablet by mouth At Night As Needed for Sleep., Disp: , Rfl:     magnesium oxide (MAG-OX) 400 MG tablet, Take 1 tablet by mouth Daily., Disp: , Rfl:     multivitamin with minerals tablet tablet, Take 1 tablet by mouth Daily., Disp: , Rfl:     omeprazole (priLOSEC) 20 MG capsule, Take 1 capsule by mouth As Needed., Disp:  ", Rfl:     Allergies   Allergen Reactions    Latex Rash       Social History     Socioeconomic History    Marital status:    Tobacco Use    Smoking status: Never    Smokeless tobacco: Never   Vaping Use    Vaping status: Never Used   Substance and Sexual Activity    Alcohol use: Never    Drug use: Never    Sexual activity: Yes     Partners: Male     Birth control/protection: None     Comment:  only     Family History   Problem Relation Age of Onset    Obesity Mother         had gastric bypass surgery    Hyperlipidemia Mother     Hypertension Mother     Diabetes type II Father 55        passed at 73    Hypertension Father     Alzheimer's disease Father     Kidney disease Father         agent orange [place]    Sleep apnea Father     Hypertension Brother     Obesity Brother     Hyperlipidemia Brother     Sleep apnea Brother     Alzheimer's disease Maternal Grandmother     Stroke Maternal Grandmother     Prostate cancer Maternal Grandfather 93    Skin cancer Maternal Grandfather     Obesity Paternal Grandmother     Hypertension Paternal Grandmother     Alzheimer's disease Paternal Grandmother     Prostate cancer Paternal Grandfather 87    Obesity Paternal Grandfather     Ovarian cancer Neg Hx     Breast cancer Neg Hx          Objective:   Physical Exam:  /84 (BP Location: Right arm, Patient Position: Sitting, Cuff Size: Adult)   Pulse 87   Ht 174 cm (68.5\")   Wt (!) 141 kg (310 lb)   SpO2 95%   BMI 46.44 kg/m²   CONSTITUTIONAL: No acute distress  RESPIRATORY: Normal effort. Clear to auscultation bilaterally without wheezing or rales  CARDIOVASCULAR: Regular rate and rhythm with normal S1 and S2. Without murmur.  PERIPHERAL VASCULAR: No carotid bruit bilaterally.  Normal radial pulse.     Labs:  Labs reviewed by myself    No results found for: \"CHOL\"  Lab Results   Component Value Date    TRIG 104 01/04/2024     Lab Results   Component Value Date    HDL 71 01/04/2024     Lab Results   Component " "Value Date     (H) 01/04/2024     No components found for: \"LDLDIRECTC\"    The 10-year ASCVD risk score (Kristy DK, et al., 2019) is: 1.9%    Values used to calculate the score:      Age: 54 years      Sex: Female      Is Non- : No      Diabetic: No      Tobacco smoker: No      Systolic Blood Pressure: 134 mmHg      Is BP treated: No      HDL Cholesterol: 71 mg/dL      Total Cholesterol: 261 mg/dL    Diagnostic Data:      ECG 12 Lead    Date/Time: 7/31/2024 3:43 PM  Performed by: Roman Hardy MD    Authorized by: Roman Hardy MD  Previous ECG: no previous ECG available  Rhythm: sinus rhythm              Assessment and Plan:     Pre-operative cardiovascular examination  Obesity  Mixed hyperlipidemia  Prediabetes   -Active without cardiopulmonary complaints, able to perform greater than 4 metabolic equivalents without difficulty  -Okay to proceed with bariatric surgery without additional preoperative cardiac testing or changes in management  -Encouraged the patient to participate in regular exercise, such as at the gym at [facility], which she has access to    - Return if symptoms worsen or fail to improve.      "

## 2024-07-31 ENCOUNTER — OFFICE VISIT (OUTPATIENT)
Dept: CARDIOLOGY | Facility: CLINIC | Age: 54
End: 2024-07-31
Payer: COMMERCIAL

## 2024-07-31 VITALS
WEIGHT: 293 LBS | DIASTOLIC BLOOD PRESSURE: 84 MMHG | SYSTOLIC BLOOD PRESSURE: 134 MMHG | HEIGHT: 69 IN | HEART RATE: 87 BPM | BODY MASS INDEX: 43.4 KG/M2 | OXYGEN SATURATION: 95 %

## 2024-07-31 DIAGNOSIS — R73.03 PREDIABETES: Chronic | ICD-10-CM

## 2024-07-31 DIAGNOSIS — Z01.810 PRE-OPERATIVE CARDIOVASCULAR EXAMINATION: Primary | ICD-10-CM

## 2024-07-31 DIAGNOSIS — E78.2 MIXED HYPERLIPIDEMIA: ICD-10-CM

## 2024-08-19 DIAGNOSIS — R53.83 FATIGUE, UNSPECIFIED TYPE: Primary | ICD-10-CM

## 2024-08-19 DIAGNOSIS — R06.00 DYSPNEA, UNSPECIFIED TYPE: ICD-10-CM

## 2024-09-03 ENCOUNTER — LAB (OUTPATIENT)
Dept: FAMILY MEDICINE CLINIC | Facility: CLINIC | Age: 54
End: 2024-09-03
Payer: COMMERCIAL

## 2024-09-04 LAB
ALBUMIN SERPL-MCNC: 4.2 G/DL (ref 3.8–4.9)
ALP SERPL-CCNC: 74 IU/L (ref 44–121)
ALT SERPL-CCNC: 30 IU/L (ref 0–32)
AST SERPL-CCNC: 22 IU/L (ref 0–40)
BILIRUB SERPL-MCNC: 0.5 MG/DL (ref 0–1.2)
BUN SERPL-MCNC: 20 MG/DL (ref 6–24)
BUN/CREAT SERPL: 25 (ref 9–23)
CALCIUM SERPL-MCNC: 9.3 MG/DL (ref 8.7–10.2)
CHLORIDE SERPL-SCNC: 102 MMOL/L (ref 96–106)
CO2 SERPL-SCNC: 23 MMOL/L (ref 20–29)
CREAT SERPL-MCNC: 0.79 MG/DL (ref 0.57–1)
EGFRCR SERPLBLD CKD-EPI 2021: 89 ML/MIN/1.73
ERYTHROCYTE [DISTWIDTH] IN BLOOD BY AUTOMATED COUNT: 14 % (ref 11.7–15.4)
GLOBULIN SER CALC-MCNC: 2.2 G/DL (ref 1.5–4.5)
GLUCOSE SERPL-MCNC: 85 MG/DL (ref 70–99)
HCT VFR BLD AUTO: 42.4 % (ref 34–46.6)
HGB BLD-MCNC: 12.9 G/DL (ref 11.1–15.9)
MCH RBC QN AUTO: 26.2 PG (ref 26.6–33)
MCHC RBC AUTO-ENTMCNC: 30.4 G/DL (ref 31.5–35.7)
MCV RBC AUTO: 86 FL (ref 79–97)
PLATELET # BLD AUTO: 248 X10E3/UL (ref 150–450)
POTASSIUM SERPL-SCNC: 4.5 MMOL/L (ref 3.5–5.2)
PROT SERPL-MCNC: 6.4 G/DL (ref 6–8.5)
RBC # BLD AUTO: 4.92 X10E6/UL (ref 3.77–5.28)
SODIUM SERPL-SCNC: 139 MMOL/L (ref 134–144)
WBC # BLD AUTO: 6.9 X10E3/UL (ref 3.4–10.8)

## 2024-09-09 ENCOUNTER — CONSULT (OUTPATIENT)
Dept: BARIATRICS/WEIGHT MGMT | Facility: CLINIC | Age: 54
End: 2024-09-09
Payer: COMMERCIAL

## 2024-09-09 VITALS
RESPIRATION RATE: 18 BRPM | WEIGHT: 293 LBS | HEART RATE: 86 BPM | DIASTOLIC BLOOD PRESSURE: 84 MMHG | OXYGEN SATURATION: 97 % | BODY MASS INDEX: 43.4 KG/M2 | SYSTOLIC BLOOD PRESSURE: 134 MMHG | TEMPERATURE: 97.8 F | HEIGHT: 69 IN

## 2024-09-09 DIAGNOSIS — R73.03 PREDIABETES: ICD-10-CM

## 2024-09-09 DIAGNOSIS — E78.2 MIXED HYPERLIPIDEMIA: ICD-10-CM

## 2024-09-09 DIAGNOSIS — E66.01 OBESITY, CLASS III, BMI 40-49.9 (MORBID OBESITY): Primary | ICD-10-CM

## 2024-09-09 DIAGNOSIS — K21.9 GASTROESOPHAGEAL REFLUX DISEASE, UNSPECIFIED WHETHER ESOPHAGITIS PRESENT: ICD-10-CM

## 2024-09-09 PROCEDURE — 99215 OFFICE O/P EST HI 40 MIN: CPT | Performed by: SURGERY

## 2024-09-09 PROCEDURE — 99417 PROLNG OP E/M EACH 15 MIN: CPT | Performed by: SURGERY

## 2024-09-09 RX ORDER — MAGNESIUM GLYCINATE 100 MG
CAPSULE ORAL
COMMUNITY

## 2024-09-09 RX ORDER — LORATADINE 10 MG/1
1 TABLET ORAL DAILY
COMMUNITY

## 2024-09-09 NOTE — H&P (VIEW-ONLY)
[facility] BARIATRIC SURGERY  [address]  [address]  [facility] [address] [phone]      Patient  Name:  Caity Hammond  :  1970      Date of Visit: 24      Chief Complaint:  weight gain; unable to maintain weight loss    History of Present Illness:  Caity Hammond is a 54 y.o. female who presents today for evaluation, education and consultation regarding weight loss surgery.     Patient has been overweight for many years, with numerous failed dietary/weight loss attempts.  She now has obesity related comorbidities of GERD, HLD, prediabetes and as such has decided to pursue weight loss surgery.         24 Update:    The patient lives in [place], and is an /stat .  Her mother had Pao en Y with Dr. Rodríguez 19 years ago.  Reports she has done well.      Takes Prilosec 20 mg PRN, Tums PRN.    No personal or family hx of VTE or clotting d/o.  No liver, lung, heart, or renal disease      Review of data:    ELLYN: n/a  CBC: nl  CMP: nl  HP neg         EGD: 24 PQ, GEJ 39 cm, +sliding HH  Path-  Final Diagnosis   1.  GASTRIC ANTRUM BIOPSY:  Minimal chronic gastritis without activity.  Negative for intestinal metaplasia and dysplasia.  No Helicobacter pylori-like organisms identified on routine stains.     2.  DISTAL ESOPHAGUS AT 38 CM, BIOPSY:  Squamous mucosa with no significant histopathologic change.  Negative for intestinal metaplasia, dysplasia, intraepithelial eosinophils and glandular mucosa.       Cardiac clearance: no additional testing Dr. Hardy    Last tobacco: n/a  Last NSAIDs: n/a  Last ASA: n/a  Last steroids: n/a  Last hormones: n/a      Past Medical History:   Diagnosis Date    Dyspepsia     Fatigue     GERD (gastroesophageal reflux disease)     prn PPI, no recent eval    Gout     Hyperlipidemia     Morbid obesity     Peptic ulceration     UGI bleed @age 6    Postoperative nausea     Prediabetes     RLS (restless legs  syndrome)      Past Surgical History:   Procedure Laterality Date     SECTION       SECTION WITH TUBAL  2005    WISDOM TOOTH EXTRACTION Bilateral        Allergies   Allergen Reactions    Latex Rash       Current Outpatient Medications:     B Complex Vitamins (vitamin b complex) capsule capsule, Take  by mouth Daily., Disp: , Rfl:     Berberine Chloride (BERBERINE HCI PO), Take  by mouth., Disp: , Rfl:     doxylamine (UNISOM) 25 MG tablet, Take 1 tablet by mouth At Night As Needed for Sleep., Disp: , Rfl:     loratadine (Claritin) 10 MG tablet, Take 1 tablet by mouth Daily., Disp: , Rfl:     magnesium oxide (MAG-OX) 400 MG tablet, Take 1 tablet by mouth Daily., Disp: , Rfl:     multivitamin with minerals tablet tablet, Take 1 tablet by mouth Daily., Disp: , Rfl:     omeprazole (priLOSEC) 20 MG capsule, Take 1 capsule by mouth As Needed., Disp: , Rfl:     Vitamin D-Vitamin K (D3 + K2) 125-100 MCG capsule, Take  by mouth., Disp: , Rfl:     Social History     Socioeconomic History    Marital status:    Tobacco Use    Smoking status: Never    Smokeless tobacco: Never   Vaping Use    Vaping status: Never Used   Substance and Sexual Activity    Alcohol use: Never    Drug use: Never    Sexual activity: Yes     Partners: Male     Birth control/protection: None     Comment:  only     Social History     Social History Narrative    Lives in [place], [place].   w/ 2 adult kids.  Works for [place] [employer] -  / .         Family History   Problem Relation Age of Onset    Obesity Mother         had gastric bypass surgery    Hyperlipidemia Mother     Hypertension Mother     Diabetes type II Father 55        passed at 73    Hypertension Father     Alzheimer's disease Father     Kidney disease Father         agent orange [place]    Sleep apnea Father     Hypertension Brother     Obesity Brother     Hyperlipidemia Brother     Sleep apnea Brother     Alzheimer's disease  Maternal Grandmother     Stroke Maternal Grandmother     Prostate cancer Maternal Grandfather 93    Skin cancer Maternal Grandfather     Obesity Paternal Grandmother     Hypertension Paternal Grandmother     Alzheimer's disease Paternal Grandmother     Prostate cancer Paternal Grandfather 87    Obesity Paternal Grandfather     Ovarian cancer Neg Hx     Breast cancer Neg Hx        Review of Systems    Physical Exam:  Vital Signs:  Weight: (!) 143 kg (315 lb)   Body mass index is 47.2 kg/m².  Temp: 97.8 °F (36.6 °C)   Heart Rate: 86   BP: 134/84     Physical Exam  Vitals reviewed.   Constitutional:       Appearance: She is well-developed.   HENT:      Head: Normocephalic and atraumatic.      Nose: Nose normal.   Eyes:      Conjunctiva/sclera: Conjunctivae normal.      Pupils: Pupils are equal, round, and reactive to light.   Neck:      Thyroid: No thyromegaly.      Vascular: No carotid bruit.      Trachea: No tracheal deviation.   Cardiovascular:      Rate and Rhythm: Normal rate and regular rhythm.      Heart sounds: Normal heart sounds.   Pulmonary:      Effort: Pulmonary effort is normal. No respiratory distress.      Breath sounds: Normal breath sounds.   Abdominal:      General: There is no distension.      Palpations: Abdomen is soft.      Tenderness: There is no abdominal tenderness.      Comments: Low transverse C section   Musculoskeletal:         General: No deformity. Normal range of motion.      Cervical back: Normal range of motion and neck supple.      Right lower leg: Edema present.      Left lower leg: Edema present.   Skin:     General: Skin is warm and dry.      Findings: No rash.   Neurological:      Mental Status: She is alert and oriented to person, place, and time.      Cranial Nerves: No cranial nerve deficit.      Coordination: Coordination normal.   Psychiatric:         Behavior: Behavior normal.         Thought Content: Thought content normal.         Judgment: Judgment normal.          Problem List Items Addressed This Visit       Prediabetes (Chronic)    Mixed hyperlipidemia (Chronic)    GERD (gastroesophageal reflux disease)    Overview     prn PPI, no recent eval          Other Visit Diagnoses       Obesity, Class III, BMI 40-49.9 (morbid obesity)    -  Primary            Assessment:    Caity Hammond is a 54 y.o. year old female with medically complicated obesity.    Weight loss surgery is deemed medically necessary given the following obesity related comorbidities including GERD, HLD, prediabetes  with current Weight: (!) 143 kg (315 lb) and Body mass index is 47.2 kg/m²..    Patient is aware that surgery is a tool, and that weight loss is not guaranteed but only seen in the context of appropriate use, follow up and exercise.    The patient was present for an approximately a 2.5 hour discussion of the purpose of weight loss surgery, how WLS is a tool to assist in achieving weight loss goals, the most common complications and how best to avoid them, and the strategies for short and long term weight loss.  Ample opportunity to discuss questions was available both in group and during the time of individual examination.    I reviewed all available preop labs, Xrays, tests, clearances, etc and signed off on these in the record.  All of this in addition to the patient's unique history and exam has been taken into consideration in determining their appropriate candidacy for weight loss surgery.    Complications  of laparoscopic/possible robotic gastric sleeve were discussed. The patient is well aware of the potential complications of surgery that include but not limited to bleeding, infections, deep venous thrombosis, pulmonary embolism, pulmonary complications such as pneumonia, cardiac events, hernias, small bowel obstruction, damage to the spleen or other organs, bowel injury, disfiguring scars, failure to lose weight, need for additional surgery, conversion to an open procedure, and  "death. Patient is also aware of complications which apply in this particular procedure that can include but are not limited to a \"leak\" at the staple line which in some instances may require conversion to gastric bypass.    The patient is aware if a hiatal hernia is encountered, it likely will be repaired.  R/B/A Rx to hiatal hernia repair were discussed as outlined in our long consent form.  Briefly risks in addition to those for LSG include recurrent hernia, SALO, dysphagia, esophageal injury, pneumothorax, injury to the vagus nerves, injury to the thoracic duct, aorta or vena cava.    Greater than 3 minutes was spent with the patient discussing avoiding all tobacco products and second hand smoke at least 2 weeks pre-operatively and 6 weeks post-operatively to minimize the risk of sleeve leak.  This included discussing the importance of avoiding even secondhand smoke as the risk of leak is increased.  Examples discussed:  I made it very clear that the patient understands they should avoid even riding in a car where someone has previously smoked in the last 2 weeks, living in a house where someone smokes (even if it's in a separate room/patio/attached garage, etc.) we discussed that they should not have a conversation with a group of people who are smoking even if it's outside.  They can be around wood burning fires and barbecue.  I told them I do not know if marijuana has a same effects but my overall recommendation is to avoid it for 2 weeks prior in 6 weeks after surgery.  They also are aware that nicotine may also increase the risk of leak and I strongly encouraged him to avoid that as well for 2 weeks prior in 6 weeks after surgery.    Discussed the risks, benefits and alternative therapies at great length as outlined in our extensive consent forms, consent videos, and educational teaching process under the direction of the center's .    A copy of the patient's signed informed consent is on " file.    Plan:  Laparoscopic , possible robotic assisted sleeve gastrectomy + HHR      R/B/A Rx discussed to postop anticoagulation including but not limited to bleeding, drug reaction, venothromboembolic events, etc. and she declined.    MDM high:  Elective procedure with the following risk factors: morbid obesity,  4+ chronic medical problems reviewed.    I spent 60 minutes caring for Caity on this date of service. This time includes time spent by me in the following activities: preparing for the visit, reviewing tests, performing a medically appropriate examination and/or evaluation, counseling and educating the patient/family/caregiver, referring and communicating with other health care professionals, documenting information in the medical record, independently interpreting results and communicating that information with the patient/family/caregiver, care coordination, ordering medications, ordering test(s), ordering procedure(s), obtaining a separately obtained history, and reviewing a separately obtained history.       Thank you Radha Madden APRN for allowing me to share in the care of our mutual patient.             Tracy Augustine MD

## 2024-09-09 NOTE — PROGRESS NOTES
[facility] BARIATRIC SURGERY  [address]  [address]  [facility] [id number] [phone]      Patient  Name:  Caity Hammond  :  1970      Date of Visit: 24      Chief Complaint:  weight gain; unable to maintain weight loss    History of Present Illness:  Caity Hammond is a 54 y.o. female who presents today for evaluation, education and consultation regarding weight loss surgery.     Patient has been overweight for many years, with numerous failed dietary/weight loss attempts.  She now has obesity related comorbidities of GERD, HLD, prediabetes and as such has decided to pursue weight loss surgery.         24 Update:    The patient lives in [place], and is an /stat .  Her mother had Pao en Y with Dr. Rodríguez 19 years ago.  Reports she has done well.      Takes Prilosec 20 mg PRN, Tums PRN.    No personal or family hx of VTE or clotting d/o.  No liver, lung, heart, or renal disease      Review of data:    ELLYN: n/a  CBC: nl  CMP: nl  HP neg         EGD: 24 PQ, GEJ 39 cm, +sliding HH  Path-  Final Diagnosis   1.  GASTRIC ANTRUM BIOPSY:  Minimal chronic gastritis without activity.  Negative for intestinal metaplasia and dysplasia.  No Helicobacter pylori-like organisms identified on routine stains.     2.  DISTAL ESOPHAGUS AT 38 CM, BIOPSY:  Squamous mucosa with no significant histopathologic change.  Negative for intestinal metaplasia, dysplasia, intraepithelial eosinophils and glandular mucosa.       Cardiac clearance: no additional testing Dr. Hardy    Last tobacco: n/a  Last NSAIDs: n/a  Last ASA: n/a  Last steroids: n/a  Last hormones: n/a      Past Medical History:   Diagnosis Date    Dyspepsia     Fatigue     GERD (gastroesophageal reflux disease)     prn PPI, no recent eval    Gout     Hyperlipidemia     Morbid obesity     Peptic ulceration     UGI bleed @age 6    Postoperative nausea     Prediabetes     RLS (restless legs  syndrome)      Past Surgical History:   Procedure Laterality Date     SECTION       SECTION WITH TUBAL  2005    WISDOM TOOTH EXTRACTION Bilateral        Allergies   Allergen Reactions    Latex Rash       Current Outpatient Medications:     B Complex Vitamins (vitamin b complex) capsule capsule, Take  by mouth Daily., Disp: , Rfl:     Berberine Chloride (BERBERINE HCI PO), Take  by mouth., Disp: , Rfl:     doxylamine (UNISOM) 25 MG tablet, Take 1 tablet by mouth At Night As Needed for Sleep., Disp: , Rfl:     loratadine (Claritin) 10 MG tablet, Take 1 tablet by mouth Daily., Disp: , Rfl:     magnesium oxide (MAG-OX) 400 MG tablet, Take 1 tablet by mouth Daily., Disp: , Rfl:     multivitamin with minerals tablet tablet, Take 1 tablet by mouth Daily., Disp: , Rfl:     omeprazole (priLOSEC) 20 MG capsule, Take 1 capsule by mouth As Needed., Disp: , Rfl:     Vitamin D-Vitamin K (D3 + K2) 125-100 MCG capsule, Take  by mouth., Disp: , Rfl:     Social History     Socioeconomic History    Marital status:    Tobacco Use    Smoking status: Never    Smokeless tobacco: Never   Vaping Use    Vaping status: Never Used   Substance and Sexual Activity    Alcohol use: Never    Drug use: Never    Sexual activity: Yes     Partners: Male     Birth control/protection: None     Comment:  only     Social History     Social History Narrative    Lives in [place], [place].   w/ 2 adult kids.  Works for [place] [employer] -  / .         Family History   Problem Relation Age of Onset    Obesity Mother         had gastric bypass surgery    Hyperlipidemia Mother     Hypertension Mother     Diabetes type II Father 55        passed at 73    Hypertension Father     Alzheimer's disease Father     Kidney disease Father         agent orange [place]    Sleep apnea Father     Hypertension Brother     Obesity Brother     Hyperlipidemia Brother     Sleep apnea Brother     Alzheimer's disease  Maternal Grandmother     Stroke Maternal Grandmother     Prostate cancer Maternal Grandfather 93    Skin cancer Maternal Grandfather     Obesity Paternal Grandmother     Hypertension Paternal Grandmother     Alzheimer's disease Paternal Grandmother     Prostate cancer Paternal Grandfather 87    Obesity Paternal Grandfather     Ovarian cancer Neg Hx     Breast cancer Neg Hx        Review of Systems    Physical Exam:  Vital Signs:  Weight: (!) 143 kg (315 lb)   Body mass index is 47.2 kg/m².  Temp: 97.8 °F (36.6 °C)   Heart Rate: 86   BP: 134/84     Physical Exam  Vitals reviewed.   Constitutional:       Appearance: She is well-developed.   HENT:      Head: Normocephalic and atraumatic.      Nose: Nose normal.   Eyes:      Conjunctiva/sclera: Conjunctivae normal.      Pupils: Pupils are equal, round, and reactive to light.   Neck:      Thyroid: No thyromegaly.      Vascular: No carotid bruit.      Trachea: No tracheal deviation.   Cardiovascular:      Rate and Rhythm: Normal rate and regular rhythm.      Heart sounds: Normal heart sounds.   Pulmonary:      Effort: Pulmonary effort is normal. No respiratory distress.      Breath sounds: Normal breath sounds.   Abdominal:      General: There is no distension.      Palpations: Abdomen is soft.      Tenderness: There is no abdominal tenderness.      Comments: Low transverse C section   Musculoskeletal:         General: No deformity. Normal range of motion.      Cervical back: Normal range of motion and neck supple.      Right lower leg: Edema present.      Left lower leg: Edema present.   Skin:     General: Skin is warm and dry.      Findings: No rash.   Neurological:      Mental Status: She is alert and oriented to person, place, and time.      Cranial Nerves: No cranial nerve deficit.      Coordination: Coordination normal.   Psychiatric:         Behavior: Behavior normal.         Thought Content: Thought content normal.         Judgment: Judgment normal.          Problem List Items Addressed This Visit       Prediabetes (Chronic)    Mixed hyperlipidemia (Chronic)    GERD (gastroesophageal reflux disease)    Overview     prn PPI, no recent eval          Other Visit Diagnoses       Obesity, Class III, BMI 40-49.9 (morbid obesity)    -  Primary            Assessment:    Caity Hammond is a 54 y.o. year old female with medically complicated obesity.    Weight loss surgery is deemed medically necessary given the following obesity related comorbidities including GERD, HLD, prediabetes  with current Weight: (!) 143 kg (315 lb) and Body mass index is 47.2 kg/m²..    Patient is aware that surgery is a tool, and that weight loss is not guaranteed but only seen in the context of appropriate use, follow up and exercise.    The patient was present for an approximately a 2.5 hour discussion of the purpose of weight loss surgery, how WLS is a tool to assist in achieving weight loss goals, the most common complications and how best to avoid them, and the strategies for short and long term weight loss.  Ample opportunity to discuss questions was available both in group and during the time of individual examination.    I reviewed all available preop labs, Xrays, tests, clearances, etc and signed off on these in the record.  All of this in addition to the patient's unique history and exam has been taken into consideration in determining their appropriate candidacy for weight loss surgery.    Complications  of laparoscopic/possible robotic gastric sleeve were discussed. The patient is well aware of the potential complications of surgery that include but not limited to bleeding, infections, deep venous thrombosis, pulmonary embolism, pulmonary complications such as pneumonia, cardiac events, hernias, small bowel obstruction, damage to the spleen or other organs, bowel injury, disfiguring scars, failure to lose weight, need for additional surgery, conversion to an open procedure, and  "death. Patient is also aware of complications which apply in this particular procedure that can include but are not limited to a \"leak\" at the staple line which in some instances may require conversion to gastric bypass.    The patient is aware if a hiatal hernia is encountered, it likely will be repaired.  R/B/A Rx to hiatal hernia repair were discussed as outlined in our long consent form.  Briefly risks in addition to those for LSG include recurrent hernia, SALO, dysphagia, esophageal injury, pneumothorax, injury to the vagus nerves, injury to the thoracic duct, aorta or vena cava.    Greater than 3 minutes was spent with the patient discussing avoiding all tobacco products and second hand smoke at least 2 weeks pre-operatively and 6 weeks post-operatively to minimize the risk of sleeve leak.  This included discussing the importance of avoiding even secondhand smoke as the risk of leak is increased.  Examples discussed:  I made it very clear that the patient understands they should avoid even riding in a car where someone has previously smoked in the last 2 weeks, living in a house where someone smokes (even if it's in a separate room/patio/attached garage, etc.) we discussed that they should not have a conversation with a group of people who are smoking even if it's outside.  They can be around wood burning fires and barbecue.  I told them I do not know if marijuana has a same effects but my overall recommendation is to avoid it for 2 weeks prior in 6 weeks after surgery.  They also are aware that nicotine may also increase the risk of leak and I strongly encouraged him to avoid that as well for 2 weeks prior in 6 weeks after surgery.    Discussed the risks, benefits and alternative therapies at great length as outlined in our extensive consent forms, consent videos, and educational teaching process under the direction of the center's .    A copy of the patient's signed informed consent is on " file.    Plan:  Laparoscopic , possible robotic assisted sleeve gastrectomy + HHR      R/B/A Rx discussed to postop anticoagulation including but not limited to bleeding, drug reaction, venothromboembolic events, etc. and she declined.    MDM high:  Elective procedure with the following risk factors: morbid obesity,  4+ chronic medical problems reviewed.    I spent 60 minutes caring for Caity on this date of service. This time includes time spent by me in the following activities: preparing for the visit, reviewing tests, performing a medically appropriate examination and/or evaluation, counseling and educating the patient/family/caregiver, referring and communicating with other health care professionals, documenting information in the medical record, independently interpreting results and communicating that information with the patient/family/caregiver, care coordination, ordering medications, ordering test(s), ordering procedure(s), obtaining a separately obtained history, and reviewing a separately obtained history.       Thank you Radha Madden APRN for allowing me to share in the care of our mutual patient.             Tracy Augustine MD

## 2024-09-09 NOTE — LETTER
September 10, 2024       No Recipients    Patient: Caity Hammond   YOB: 1970   Date of Visit: 2024     Dear JUAN Hazel:       Thank you for referring Caity Hammond to me for evaluation. Below are the relevant portions of my assessment and plan of care.    If you have questions, please do not hesitate to call me. I look forward to following Caity along with you.         Sincerely,        Tracy Augustine MD        CC:   No Recipients    Tracy Augustine MD  09/10/24 1735  Sign when Signing Visit  [facility] BARIATRIC SURGERY  [address]  [address]  [facility] [address] [phone]      Patient  Name:  Caity Hammond  :  1970      Date of Visit: 24      Chief Complaint:  weight gain; unable to maintain weight loss    History of Present Illness:  Caity Hammond is a 54 y.o. female who presents today for evaluation, education and consultation regarding weight loss surgery.     Patient has been overweight for many years, with numerous failed dietary/weight loss attempts.  She now has obesity related comorbidities of GERD, HLD, prediabetes and as such has decided to pursue weight loss surgery.         24 Update:    The patient lives in [place], and is an /stat .  Her mother had Pao en Y with Dr. Rodríguez 19 years ago.  Reports she has done well.      Takes Prilosec 20 mg PRN, Tums PRN.    No personal or family hx of VTE or clotting d/o.  No liver, lung, heart, or renal disease      Review of data:    ELLYN: n/a  CBC: nl  CMP: nl  HP neg         EGD: 24 PQ, GEJ 39 cm, +sliding HH  Path-  Final Diagnosis   1.  GASTRIC ANTRUM BIOPSY:  Minimal chronic gastritis without activity.  Negative for intestinal metaplasia and dysplasia.  No Helicobacter pylori-like organisms identified on routine stains.     2.  DISTAL ESOPHAGUS AT 38 CM, BIOPSY:  Squamous mucosa with no significant histopathologic  change.  Negative for intestinal metaplasia, dysplasia, intraepithelial eosinophils and glandular mucosa.       Cardiac clearance: no additional testing Dr. Hardy    Last tobacco: n/a  Last NSAIDs: n/a  Last ASA: n/a  Last steroids: n/a  Last hormones: n/a      Past Medical History:   Diagnosis Date   • Dyspepsia    • Fatigue    • GERD (gastroesophageal reflux disease)     prn PPI, no recent eval   • Gout    • Hyperlipidemia    • Morbid obesity    • Peptic ulceration     UGI bleed @age 6   • Postoperative nausea    • Prediabetes    • RLS (restless legs syndrome)      Past Surgical History:   Procedure Laterality Date   •  SECTION     •  SECTION WITH TUBAL     • WISDOM TOOTH EXTRACTION Bilateral        Allergies   Allergen Reactions   • Latex Rash       Current Outpatient Medications:   •  B Complex Vitamins (vitamin b complex) capsule capsule, Take  by mouth Daily., Disp: , Rfl:   •  Berberine Chloride (BERBERINE HCI PO), Take  by mouth., Disp: , Rfl:   •  doxylamine (UNISOM) 25 MG tablet, Take 1 tablet by mouth At Night As Needed for Sleep., Disp: , Rfl:   •  loratadine (Claritin) 10 MG tablet, Take 1 tablet by mouth Daily., Disp: , Rfl:   •  magnesium oxide (MAG-OX) 400 MG tablet, Take 1 tablet by mouth Daily., Disp: , Rfl:   •  multivitamin with minerals tablet tablet, Take 1 tablet by mouth Daily., Disp: , Rfl:   •  omeprazole (priLOSEC) 20 MG capsule, Take 1 capsule by mouth As Needed., Disp: , Rfl:   •  Vitamin D-Vitamin K (D3 + K2) 125-100 MCG capsule, Take  by mouth., Disp: , Rfl:     Social History     Socioeconomic History   • Marital status:    Tobacco Use   • Smoking status: Never   • Smokeless tobacco: Never   Vaping Use   • Vaping status: Never Used   Substance and Sexual Activity   • Alcohol use: Never   • Drug use: Never   • Sexual activity: Yes     Partners: Male     Birth control/protection: None     Comment:  only     Social History     Social History  Narrative    Lives in [place], [place].   w/ 2 adult kids.  Works for [place] [employer] -  / .         Family History   Problem Relation Age of Onset   • Obesity Mother         had gastric bypass surgery   • Hyperlipidemia Mother    • Hypertension Mother    • Diabetes type II Father 55        passed at 73   • Hypertension Father    • Alzheimer's disease Father    • Kidney disease Father         agent orange [place]   • Sleep apnea Father    • Hypertension Brother    • Obesity Brother    • Hyperlipidemia Brother    • Sleep apnea Brother    • Alzheimer's disease Maternal Grandmother    • Stroke Maternal Grandmother    • Prostate cancer Maternal Grandfather 93   • Skin cancer Maternal Grandfather    • Obesity Paternal Grandmother    • Hypertension Paternal Grandmother    • Alzheimer's disease Paternal Grandmother    • Prostate cancer Paternal Grandfather 87   • Obesity Paternal Grandfather    • Ovarian cancer Neg Hx    • Breast cancer Neg Hx        Review of Systems    Physical Exam:  Vital Signs:  Weight: (!) 143 kg (315 lb)   Body mass index is 47.2 kg/m².  Temp: 97.8 °F (36.6 °C)   Heart Rate: 86   BP: 134/84     Physical Exam  Vitals reviewed.   Constitutional:       Appearance: She is well-developed.   HENT:      Head: Normocephalic and atraumatic.      Nose: Nose normal.   Eyes:      Conjunctiva/sclera: Conjunctivae normal.      Pupils: Pupils are equal, round, and reactive to light.   Neck:      Thyroid: No thyromegaly.      Vascular: No carotid bruit.      Trachea: No tracheal deviation.   Cardiovascular:      Rate and Rhythm: Normal rate and regular rhythm.      Heart sounds: Normal heart sounds.   Pulmonary:      Effort: Pulmonary effort is normal. No respiratory distress.      Breath sounds: Normal breath sounds.   Abdominal:      General: There is no distension.      Palpations: Abdomen is soft.      Tenderness: There is no abdominal tenderness.      Comments: Low transverse C section    Musculoskeletal:         General: No deformity. Normal range of motion.      Cervical back: Normal range of motion and neck supple.      Right lower leg: Edema present.      Left lower leg: Edema present.   Skin:     General: Skin is warm and dry.      Findings: No rash.   Neurological:      Mental Status: She is alert and oriented to person, place, and time.      Cranial Nerves: No cranial nerve deficit.      Coordination: Coordination normal.   Psychiatric:         Behavior: Behavior normal.         Thought Content: Thought content normal.         Judgment: Judgment normal.         Problem List Items Addressed This Visit       Prediabetes (Chronic)    Mixed hyperlipidemia (Chronic)    GERD (gastroesophageal reflux disease)    Overview     prn PPI, no recent eval          Other Visit Diagnoses       Obesity, Class III, BMI 40-49.9 (morbid obesity)    -  Primary            Assessment:    Caity Hammond is a 54 y.o. year old female with medically complicated obesity.    Weight loss surgery is deemed medically necessary given the following obesity related comorbidities including GERD, HLD, prediabetes  with current Weight: (!) 143 kg (315 lb) and Body mass index is 47.2 kg/m²..    Patient is aware that surgery is a tool, and that weight loss is not guaranteed but only seen in the context of appropriate use, follow up and exercise.    The patient was present for an approximately a 2.5 hour discussion of the purpose of weight loss surgery, how WLS is a tool to assist in achieving weight loss goals, the most common complications and how best to avoid them, and the strategies for short and long term weight loss.  Ample opportunity to discuss questions was available both in group and during the time of individual examination.    I reviewed all available preop labs, Xrays, tests, clearances, etc and signed off on these in the record.  All of this in addition to the patient's unique history and exam has been taken into  "consideration in determining their appropriate candidacy for weight loss surgery.    Complications  of laparoscopic/possible robotic gastric sleeve were discussed. The patient is well aware of the potential complications of surgery that include but not limited to bleeding, infections, deep venous thrombosis, pulmonary embolism, pulmonary complications such as pneumonia, cardiac events, hernias, small bowel obstruction, damage to the spleen or other organs, bowel injury, disfiguring scars, failure to lose weight, need for additional surgery, conversion to an open procedure, and death. Patient is also aware of complications which apply in this particular procedure that can include but are not limited to a \"leak\" at the staple line which in some instances may require conversion to gastric bypass.    The patient is aware if a hiatal hernia is encountered, it likely will be repaired.  R/B/A Rx to hiatal hernia repair were discussed as outlined in our long consent form.  Briefly risks in addition to those for LSG include recurrent hernia, SALO, dysphagia, esophageal injury, pneumothorax, injury to the vagus nerves, injury to the thoracic duct, aorta or vena cava.    Greater than 3 minutes was spent with the patient discussing avoiding all tobacco products and second hand smoke at least 2 weeks pre-operatively and 6 weeks post-operatively to minimize the risk of sleeve leak.  This included discussing the importance of avoiding even secondhand smoke as the risk of leak is increased.  Examples discussed:  I made it very clear that the patient understands they should avoid even riding in a car where someone has previously smoked in the last 2 weeks, living in a house where someone smokes (even if it's in a separate room/patio/attached garage, etc.) we discussed that they should not have a conversation with a group of people who are smoking even if it's outside.  They can be around wood burning fires and barbecue.  I told them " I do not know if marijuana has a same effects but my overall recommendation is to avoid it for 2 weeks prior in 6 weeks after surgery.  They also are aware that nicotine may also increase the risk of leak and I strongly encouraged him to avoid that as well for 2 weeks prior in 6 weeks after surgery.    Discussed the risks, benefits and alternative therapies at great length as outlined in our extensive consent forms, consent videos, and educational teaching process under the direction of the center's .    A copy of the patient's signed informed consent is on file.    Plan:  Laparoscopic , possible robotic assisted sleeve gastrectomy + HHR      R/B/A Rx discussed to postop anticoagulation including but not limited to bleeding, drug reaction, venothromboembolic events, etc. and she declined.    MDM high:  Elective procedure with the following risk factors: morbid obesity,  4+ chronic medical problems reviewed.    I spent 60 minutes caring for Caity on this date of service. This time includes time spent by me in the following activities: preparing for the visit, reviewing tests, performing a medically appropriate examination and/or evaluation, counseling and educating the patient/family/caregiver, referring and communicating with other health care professionals, documenting information in the medical record, independently interpreting results and communicating that information with the patient/family/caregiver, care coordination, ordering medications, ordering test(s), ordering procedure(s), obtaining a separately obtained history, and reviewing a separately obtained history.       Thank you Radha Madden APRN for allowing me to share in the care of our mutual patient.             Tracy Augustine MD

## 2024-09-10 RX ORDER — CHLORHEXIDINE GLUCONATE ORAL RINSE 1.2 MG/ML
15 SOLUTION DENTAL
OUTPATIENT
Start: 2024-09-10 | End: 2024-09-10

## 2024-09-10 RX ORDER — ENOXAPARIN SODIUM 100 MG/ML
40 INJECTION SUBCUTANEOUS ONCE
OUTPATIENT
Start: 2024-09-10 | End: 2024-09-10

## 2024-09-10 RX ORDER — SODIUM CHLORIDE 9 MG/ML
40 INJECTION, SOLUTION INTRAVENOUS AS NEEDED
OUTPATIENT
Start: 2024-09-10

## 2024-09-10 RX ORDER — SODIUM CHLORIDE 0.9 % (FLUSH) 0.9 %
10 SYRINGE (ML) INJECTION AS NEEDED
OUTPATIENT
Start: 2024-09-10

## 2024-09-10 RX ORDER — SCOLOPAMINE TRANSDERMAL SYSTEM 1 MG/1
1 PATCH, EXTENDED RELEASE TRANSDERMAL ONCE
OUTPATIENT
Start: 2024-09-10 | End: 2024-09-10

## 2024-09-10 RX ORDER — ACETAMINOPHEN 500 MG
1000 TABLET ORAL ONCE
OUTPATIENT
Start: 2024-09-10 | End: 2024-09-10

## 2024-09-10 RX ORDER — PANTOPRAZOLE SODIUM 40 MG/10ML
40 INJECTION, POWDER, LYOPHILIZED, FOR SOLUTION INTRAVENOUS ONCE
OUTPATIENT
Start: 2024-09-10 | End: 2024-09-10

## 2024-09-10 RX ORDER — SODIUM CHLORIDE 9 MG/ML
150 INJECTION, SOLUTION INTRAVENOUS CONTINUOUS
OUTPATIENT
Start: 2024-09-10

## 2024-09-10 RX ORDER — GABAPENTIN 100 MG/1
600 CAPSULE ORAL ONCE
OUTPATIENT
Start: 2024-09-10 | End: 2024-09-10

## 2024-09-10 RX ORDER — SODIUM CHLORIDE 0.9 % (FLUSH) 0.9 %
3 SYRINGE (ML) INJECTION EVERY 12 HOURS SCHEDULED
OUTPATIENT
Start: 2024-09-10

## 2024-09-23 PROBLEM — E66.01 OBESITY, CLASS III, BMI 40-49.9 (MORBID OBESITY): Status: ACTIVE | Noted: 2024-09-09

## 2024-09-23 PROBLEM — E66.813 OBESITY, CLASS III, BMI 40-49.9 (MORBID OBESITY): Status: ACTIVE | Noted: 2024-09-09

## 2024-09-30 ENCOUNTER — PRE-ADMISSION TESTING (OUTPATIENT)
Dept: PREADMISSION TESTING | Facility: HOSPITAL | Age: 54
DRG: 621 | End: 2024-09-30
Payer: COMMERCIAL

## 2024-09-30 DIAGNOSIS — E66.01 OBESITY, CLASS III, BMI 40-49.9 (MORBID OBESITY): ICD-10-CM

## 2024-09-30 DIAGNOSIS — K21.9 GASTROESOPHAGEAL REFLUX DISEASE, UNSPECIFIED WHETHER ESOPHAGITIS PRESENT: ICD-10-CM

## 2024-09-30 PROCEDURE — 80053 COMPREHEN METABOLIC PANEL: CPT | Performed by: PHYSICIAN ASSISTANT

## 2024-09-30 PROCEDURE — 85027 COMPLETE CBC AUTOMATED: CPT | Performed by: PHYSICIAN ASSISTANT

## 2024-09-30 PROCEDURE — 87081 CULTURE SCREEN ONLY: CPT

## 2024-09-30 NOTE — DISCHARGE INSTRUCTIONS
Pre-Admission testing appointment completed today for patient's upcoming procedure on 10/3/24 at [facility].     PAT PASS reviewed with patient and they verbalize understanding of the following:     Do not eat or drink anything after midnight the night before procedure unless otherwise instructed by physician/surgeon's office, this includes no gum, candy, mints, tobacco products or e-cigarettes.  Do not shave the area to be operated on at least 48 hours prior to procedure.  Do not wear makeup, lotion, hair products, or nail polish.  Do not wear any jewelry and remove all piercings.  Do not wear any adhesive if you wear dentures.  Do not wear contacts; bring in glasses if needed.  Only take medications on the morning of procedure as instructed by PAT nurse per anesthesia guidelines or as instructed by physician's office.  If you are on any blood thinners reach out to the physician/surgeon's office for instructions on when/if they will need to be stopped prior to procedure.  Bring in picture ID and insurance card, advanced directive copies if applicable, CPAP/BIPAP/Inhalers if indicated morning of procedure, leave any other valuables at home.  Ensure you have arranged for someone to drive you home the day of your procedure and someone to care for you at home afterwards. It is recommended that you do not drive, drink alcohol, or make any major legal decisions for at least 24 hours after your procedure is complete.   Chlorhexidine sponge along with instruction/information sheet given to patient. Readybath wipes given in lieu of CHG if patient has CHG allergy. Instructed patient to date, time, and initial the verification sheet once skin prep has been completed, and to return to [facility] the day of the procedure.       Introduction to anesthesia video watched by patient during appointment and anesthesia FAQ tip sheet given to patient.  Instructions and information given to patient about parking, hospital  entrance, and registration location.

## 2024-10-02 LAB — MRSA SPEC QL CULT: NORMAL

## 2024-10-03 ENCOUNTER — ANESTHESIA EVENT CONVERTED (OUTPATIENT)
Dept: ANESTHESIOLOGY | Facility: HOSPITAL | Age: 54
DRG: 621 | End: 2024-10-03
Payer: COMMERCIAL

## 2024-10-03 ENCOUNTER — ANESTHESIA EVENT (OUTPATIENT)
Dept: PERIOP | Facility: HOSPITAL | Age: 54
End: 2024-10-03
Payer: COMMERCIAL

## 2024-10-03 ENCOUNTER — HOSPITAL ENCOUNTER (INPATIENT)
Facility: HOSPITAL | Age: 54
LOS: 1 days | Discharge: HOME | DRG: 621 | End: 2024-10-04
Attending: SURGERY | Admitting: SURGERY
Payer: COMMERCIAL

## 2024-10-03 ENCOUNTER — ANESTHESIA (OUTPATIENT)
Dept: PERIOP | Facility: HOSPITAL | Age: 54
End: 2024-10-03
Payer: COMMERCIAL

## 2024-10-03 DIAGNOSIS — K21.9 GASTROESOPHAGEAL REFLUX DISEASE, UNSPECIFIED WHETHER ESOPHAGITIS PRESENT: ICD-10-CM

## 2024-10-03 DIAGNOSIS — E66.01 OBESITY, CLASS III, BMI 40-49.9 (MORBID OBESITY): ICD-10-CM

## 2024-10-03 PROCEDURE — 25010000002 THIAMINE PER 100 MG: Performed by: SURGERY

## 2024-10-03 PROCEDURE — 25810000003 SODIUM CHLORIDE 0.9 % SOLUTION: Performed by: SURGERY

## 2024-10-03 PROCEDURE — 25010000002 CEFAZOLIN 3 G RECONSTITUTED SOLUTION 1 EACH VIAL: Performed by: SURGERY

## 2024-10-03 PROCEDURE — 25010000002 HYDROMORPHONE 1 MG/ML SOLUTION: Performed by: SURGERY

## 2024-10-03 PROCEDURE — 43775 LAP SLEEVE GASTRECTOMY: CPT | Performed by: SURGERY

## 2024-10-03 PROCEDURE — 25010000002 PROPOFOL 10 MG/ML EMULSION: Performed by: NURSE ANESTHETIST, CERTIFIED REGISTERED

## 2024-10-03 PROCEDURE — 25010000002 DEXAMETHASONE SODIUM PHOSPHATE 10 MG/ML SOLUTION: Performed by: NURSE ANESTHETIST, CERTIFIED REGISTERED

## 2024-10-03 PROCEDURE — 0DB64Z3 EXCISION OF STOMACH, PERCUTANEOUS ENDOSCOPIC APPROACH, VERTICAL: ICD-10-PCS | Performed by: SURGERY

## 2024-10-03 PROCEDURE — 25010000002 GLUCAGON (RDNA) PER 1 MG: Performed by: NURSE ANESTHETIST, CERTIFIED REGISTERED

## 2024-10-03 PROCEDURE — 25010000002 ONDANSETRON PER 1 MG: Performed by: NURSE ANESTHETIST, CERTIFIED REGISTERED

## 2024-10-03 PROCEDURE — 25010000002 FENTANYL CITRATE (PF) 50 MCG/ML SOLUTION PREFILLED SYRINGE: Performed by: NURSE ANESTHETIST, CERTIFIED REGISTERED

## 2024-10-03 PROCEDURE — 8E0W4CZ ROBOTIC ASSISTED PROCEDURE OF TRUNK REGION, PERCUTANEOUS ENDOSCOPIC APPROACH: ICD-10-PCS | Performed by: SURGERY

## 2024-10-03 PROCEDURE — 25010000002 BUPIVACAINE (PF) 0.25 % SOLUTION: Performed by: NURSE ANESTHETIST, CERTIFIED REGISTERED

## 2024-10-03 PROCEDURE — 25010000002 ONDANSETRON PER 1 MG: Performed by: SURGERY

## 2024-10-03 PROCEDURE — 0BQT4ZZ REPAIR DIAPHRAGM, PERCUTANEOUS ENDOSCOPIC APPROACH: ICD-10-PCS | Performed by: SURGERY

## 2024-10-03 PROCEDURE — 25010000002 SUGAMMADEX 500 MG/5ML SOLUTION: Performed by: NURSE ANESTHETIST, CERTIFIED REGISTERED

## 2024-10-03 PROCEDURE — 25010000002 AMISULPRIDE (ANTIEMETIC) 5 MG/2ML SOLUTION: Performed by: NURSE ANESTHETIST, CERTIFIED REGISTERED

## 2024-10-03 PROCEDURE — 25010000002 ENOXAPARIN PER 10 MG: Performed by: SURGERY

## 2024-10-03 PROCEDURE — 25010000002 HYDROMORPHONE 1 MG/ML SOLUTION: Performed by: NURSE ANESTHETIST, CERTIFIED REGISTERED

## 2024-10-03 PROCEDURE — 25010000002 HYDROMORPHONE PER 4 MG: Performed by: NURSE ANESTHETIST, CERTIFIED REGISTERED

## 2024-10-03 PROCEDURE — 25010000002 MIDAZOLAM PER 1MG: Performed by: NURSE ANESTHETIST, CERTIFIED REGISTERED

## 2024-10-03 PROCEDURE — 25810000003 LACTATED RINGERS PER 1000 ML: Performed by: SURGERY

## 2024-10-03 PROCEDURE — 25010000002 DEXAMETHASONE PER 1 MG: Performed by: NURSE ANESTHETIST, CERTIFIED REGISTERED

## 2024-10-03 DEVICE — STAPLER 60 RELOAD WHITE
Type: IMPLANTABLE DEVICE | Site: ABDOMEN | Status: FUNCTIONAL
Brand: SUREFORM

## 2024-10-03 DEVICE — STAPLER 60 RELOAD BLUE
Type: IMPLANTABLE DEVICE | Site: ABDOMEN | Status: FUNCTIONAL
Brand: SUREFORM

## 2024-10-03 DEVICE — ABSORBABLE WOUND CLOSURE DEVICE
Type: IMPLANTABLE DEVICE | Site: ABDOMEN | Status: FUNCTIONAL
Brand: SYNETURE

## 2024-10-03 DEVICE — DEV CONTRL TISS STRATAFIX SPIRAL PDS PLS CT1 2-0 45CM: Type: IMPLANTABLE DEVICE | Site: ABDOMEN | Status: FUNCTIONAL

## 2024-10-03 RX ORDER — DEXAMETHASONE SODIUM PHOSPHATE 10 MG/ML
INJECTION, SOLUTION INTRAMUSCULAR; INTRAVENOUS AS NEEDED
Status: DISCONTINUED | OUTPATIENT
Start: 2024-10-03 | End: 2024-10-03 | Stop reason: SURG

## 2024-10-03 RX ORDER — SODIUM CHLORIDE, SODIUM LACTATE, POTASSIUM CHLORIDE, CALCIUM CHLORIDE 600; 310; 30; 20 MG/100ML; MG/100ML; MG/100ML; MG/100ML
150 INJECTION, SOLUTION INTRAVENOUS CONTINUOUS
Status: ACTIVE | OUTPATIENT
Start: 2024-10-03 | End: 2024-10-04

## 2024-10-03 RX ORDER — DEXTROSE MONOHYDRATE 25 G/50ML
25 INJECTION, SOLUTION INTRAVENOUS
Status: DISCONTINUED | OUTPATIENT
Start: 2024-10-03 | End: 2024-10-04 | Stop reason: HOSPADM

## 2024-10-03 RX ORDER — METOCLOPRAMIDE HYDROCHLORIDE 5 MG/ML
10 INJECTION INTRAMUSCULAR; INTRAVENOUS EVERY 6 HOURS PRN
Status: DISCONTINUED | OUTPATIENT
Start: 2024-10-03 | End: 2024-10-04 | Stop reason: HOSPADM

## 2024-10-03 RX ORDER — CETIRIZINE HYDROCHLORIDE 10 MG/1
10 TABLET ORAL DAILY
Status: DISCONTINUED | OUTPATIENT
Start: 2024-10-03 | End: 2024-10-04 | Stop reason: HOSPADM

## 2024-10-03 RX ORDER — ONDANSETRON 2 MG/ML
4 INJECTION INTRAMUSCULAR; INTRAVENOUS EVERY 6 HOURS PRN
Status: DISCONTINUED | OUTPATIENT
Start: 2024-10-03 | End: 2024-10-04 | Stop reason: HOSPADM

## 2024-10-03 RX ORDER — MIDAZOLAM HYDROCHLORIDE 2 MG/2ML
INJECTION, SOLUTION INTRAMUSCULAR; INTRAVENOUS AS NEEDED
Status: DISCONTINUED | OUTPATIENT
Start: 2024-10-03 | End: 2024-10-03 | Stop reason: SURG

## 2024-10-03 RX ORDER — ACETAMINOPHEN 500 MG
1000 TABLET ORAL ONCE
Status: COMPLETED | OUTPATIENT
Start: 2024-10-03 | End: 2024-10-03

## 2024-10-03 RX ORDER — GABAPENTIN 300 MG/1
600 CAPSULE ORAL ONCE
Status: COMPLETED | OUTPATIENT
Start: 2024-10-03 | End: 2024-10-03

## 2024-10-03 RX ORDER — PROCHLORPERAZINE 25 MG
25 SUPPOSITORY, RECTAL RECTAL EVERY 12 HOURS PRN
Status: DISCONTINUED | OUTPATIENT
Start: 2024-10-03 | End: 2024-10-04 | Stop reason: HOSPADM

## 2024-10-03 RX ORDER — MEPERIDINE HYDROCHLORIDE 25 MG/ML
12.5 INJECTION INTRAMUSCULAR; INTRAVENOUS; SUBCUTANEOUS
Status: DISCONTINUED | OUTPATIENT
Start: 2024-10-03 | End: 2024-10-03 | Stop reason: HOSPADM

## 2024-10-03 RX ORDER — LABETALOL HYDROCHLORIDE 5 MG/ML
10 INJECTION, SOLUTION INTRAVENOUS
Status: DISCONTINUED | OUTPATIENT
Start: 2024-10-03 | End: 2024-10-04 | Stop reason: HOSPADM

## 2024-10-03 RX ORDER — HYDROMORPHONE HYDROCHLORIDE 2 MG/ML
INJECTION, SOLUTION INTRAMUSCULAR; INTRAVENOUS; SUBCUTANEOUS AS NEEDED
Status: DISCONTINUED | OUTPATIENT
Start: 2024-10-03 | End: 2024-10-03 | Stop reason: SURG

## 2024-10-03 RX ORDER — IPRATROPIUM BROMIDE AND ALBUTEROL SULFATE 2.5; .5 MG/3ML; MG/3ML
3 SOLUTION RESPIRATORY (INHALATION) ONCE AS NEEDED
Status: DISCONTINUED | OUTPATIENT
Start: 2024-10-03 | End: 2024-10-03 | Stop reason: HOSPADM

## 2024-10-03 RX ORDER — ROCURONIUM BROMIDE 50 MG/5 ML
SYRINGE (ML) INTRAVENOUS AS NEEDED
Status: DISCONTINUED | OUTPATIENT
Start: 2024-10-03 | End: 2024-10-03 | Stop reason: SURG

## 2024-10-03 RX ORDER — ONDANSETRON 4 MG/1
4 TABLET, ORALLY DISINTEGRATING ORAL EVERY 6 HOURS PRN
Status: DISCONTINUED | OUTPATIENT
Start: 2024-10-03 | End: 2024-10-04 | Stop reason: HOSPADM

## 2024-10-03 RX ORDER — FENTANYL CITRATE 50 UG/ML
INJECTION, SOLUTION INTRAMUSCULAR; INTRAVENOUS AS NEEDED
Status: DISCONTINUED | OUTPATIENT
Start: 2024-10-03 | End: 2024-10-03 | Stop reason: SURG

## 2024-10-03 RX ORDER — LIDOCAINE HCL/PF 100 MG/5ML
SYRINGE (ML) INJECTION AS NEEDED
Status: DISCONTINUED | OUTPATIENT
Start: 2024-10-03 | End: 2024-10-03 | Stop reason: SURG

## 2024-10-03 RX ORDER — CYANOCOBALAMIN 1000 UG/ML
1000 INJECTION, SOLUTION INTRAMUSCULAR; SUBCUTANEOUS ONCE
Status: COMPLETED | OUTPATIENT
Start: 2024-10-04 | End: 2024-10-04

## 2024-10-03 RX ORDER — SODIUM CHLORIDE 0.9 % (FLUSH) 0.9 %
3 SYRINGE (ML) INJECTION EVERY 12 HOURS SCHEDULED
Status: DISCONTINUED | OUTPATIENT
Start: 2024-10-03 | End: 2024-10-03 | Stop reason: HOSPADM

## 2024-10-03 RX ORDER — ENOXAPARIN SODIUM 100 MG/ML
40 INJECTION SUBCUTANEOUS EVERY 12 HOURS
Status: DISCONTINUED | OUTPATIENT
Start: 2024-10-04 | End: 2024-10-04 | Stop reason: HOSPADM

## 2024-10-03 RX ORDER — SODIUM CHLORIDE 9 MG/ML
40 INJECTION, SOLUTION INTRAVENOUS AS NEEDED
Status: DISCONTINUED | OUTPATIENT
Start: 2024-10-03 | End: 2024-10-04 | Stop reason: HOSPADM

## 2024-10-03 RX ORDER — THIAMINE HYDROCHLORIDE 100 MG/ML
100 INJECTION, SOLUTION INTRAMUSCULAR; INTRAVENOUS ONCE
Status: COMPLETED | OUTPATIENT
Start: 2024-10-03 | End: 2024-10-03

## 2024-10-03 RX ORDER — SODIUM CHLORIDE 9 MG/ML
40 INJECTION, SOLUTION INTRAVENOUS AS NEEDED
Status: DISCONTINUED | OUTPATIENT
Start: 2024-10-03 | End: 2024-10-03 | Stop reason: HOSPADM

## 2024-10-03 RX ORDER — DEXAMETHASONE SODIUM PHOSPHATE 4 MG/ML
INJECTION, SOLUTION INTRA-ARTICULAR; INTRALESIONAL; INTRAMUSCULAR; INTRAVENOUS; SOFT TISSUE AS NEEDED
Status: DISCONTINUED | OUTPATIENT
Start: 2024-10-03 | End: 2024-10-03 | Stop reason: SURG

## 2024-10-03 RX ORDER — ALPRAZOLAM 0.25 MG
0.25 TABLET ORAL 2 TIMES DAILY PRN
Status: DISCONTINUED | OUTPATIENT
Start: 2024-10-03 | End: 2024-10-04 | Stop reason: HOSPADM

## 2024-10-03 RX ORDER — METOCLOPRAMIDE 5 MG/1
10 TABLET ORAL EVERY 6 HOURS PRN
Status: DISCONTINUED | OUTPATIENT
Start: 2024-10-03 | End: 2024-10-04 | Stop reason: HOSPADM

## 2024-10-03 RX ORDER — SODIUM CHLORIDE 0.9 % (FLUSH) 0.9 %
10 SYRINGE (ML) INJECTION EVERY 12 HOURS SCHEDULED
Status: DISCONTINUED | OUTPATIENT
Start: 2024-10-03 | End: 2024-10-04 | Stop reason: HOSPADM

## 2024-10-03 RX ORDER — ONDANSETRON 2 MG/ML
4 INJECTION INTRAMUSCULAR; INTRAVENOUS ONCE AS NEEDED
Status: DISCONTINUED | OUTPATIENT
Start: 2024-10-03 | End: 2024-10-03 | Stop reason: HOSPADM

## 2024-10-03 RX ORDER — HYDROMORPHONE HYDROCHLORIDE 2 MG/1
2 TABLET ORAL EVERY 4 HOURS PRN
Status: DISCONTINUED | OUTPATIENT
Start: 2024-10-03 | End: 2024-10-04 | Stop reason: HOSPADM

## 2024-10-03 RX ORDER — NALOXONE HCL 0.4 MG/ML
0.1 VIAL (ML) INJECTION
Status: DISCONTINUED | OUTPATIENT
Start: 2024-10-03 | End: 2024-10-04 | Stop reason: HOSPADM

## 2024-10-03 RX ORDER — PROCHLORPERAZINE EDISYLATE 5 MG/ML
10 INJECTION INTRAMUSCULAR; INTRAVENOUS ONCE AS NEEDED
Status: DISCONTINUED | OUTPATIENT
Start: 2024-10-03 | End: 2024-10-03 | Stop reason: HOSPADM

## 2024-10-03 RX ORDER — PROPOFOL 10 MG/ML
VIAL (ML) INTRAVENOUS AS NEEDED
Status: DISCONTINUED | OUTPATIENT
Start: 2024-10-03 | End: 2024-10-03 | Stop reason: SURG

## 2024-10-03 RX ORDER — SODIUM CHLORIDE AND POTASSIUM CHLORIDE 150; 450 MG/100ML; MG/100ML
100 INJECTION, SOLUTION INTRAVENOUS CONTINUOUS
Status: DISCONTINUED | OUTPATIENT
Start: 2024-10-04 | End: 2024-10-04 | Stop reason: HOSPADM

## 2024-10-03 RX ORDER — OXYCODONE HYDROCHLORIDE 5 MG/1
5 TABLET ORAL EVERY 6 HOURS PRN
Status: DISCONTINUED | OUTPATIENT
Start: 2024-10-03 | End: 2024-10-04 | Stop reason: HOSPADM

## 2024-10-03 RX ORDER — CHLORHEXIDINE GLUCONATE ORAL RINSE 1.2 MG/ML
15 SOLUTION DENTAL
Status: COMPLETED | OUTPATIENT
Start: 2024-10-03 | End: 2024-10-03

## 2024-10-03 RX ORDER — PROMETHAZINE HYDROCHLORIDE 25 MG/1
12.5 SUPPOSITORY RECTAL EVERY 6 HOURS PRN
Status: DISCONTINUED | OUTPATIENT
Start: 2024-10-03 | End: 2024-10-04 | Stop reason: HOSPADM

## 2024-10-03 RX ORDER — ENOXAPARIN SODIUM 100 MG/ML
40 INJECTION SUBCUTANEOUS ONCE
Status: DISCONTINUED | OUTPATIENT
Start: 2024-10-03 | End: 2024-10-03 | Stop reason: HOSPADM

## 2024-10-03 RX ORDER — GABAPENTIN 100 MG/1
100 CAPSULE ORAL 3 TIMES DAILY
Status: DISCONTINUED | OUTPATIENT
Start: 2024-10-03 | End: 2024-10-04 | Stop reason: HOSPADM

## 2024-10-03 RX ORDER — IBUPROFEN 600 MG/1
TABLET ORAL AS NEEDED
Status: DISCONTINUED | OUTPATIENT
Start: 2024-10-03 | End: 2024-10-03 | Stop reason: SURG

## 2024-10-03 RX ORDER — SODIUM CHLORIDE 0.9 % (FLUSH) 0.9 %
10 SYRINGE (ML) INJECTION AS NEEDED
Status: DISCONTINUED | OUTPATIENT
Start: 2024-10-03 | End: 2024-10-03 | Stop reason: HOSPADM

## 2024-10-03 RX ORDER — BUPIVACAINE HYDROCHLORIDE 2.5 MG/ML
INJECTION, SOLUTION EPIDURAL; INFILTRATION; INTRACAUDAL
Status: COMPLETED | OUTPATIENT
Start: 2024-10-03 | End: 2024-10-03

## 2024-10-03 RX ORDER — GABAPENTIN 250 MG/5ML
100 SOLUTION ORAL 3 TIMES DAILY
Status: DISCONTINUED | OUTPATIENT
Start: 2024-10-03 | End: 2024-10-04 | Stop reason: HOSPADM

## 2024-10-03 RX ORDER — SIMETHICONE 80 MG
80 TABLET,CHEWABLE ORAL 4 TIMES DAILY PRN
Status: DISCONTINUED | OUTPATIENT
Start: 2024-10-03 | End: 2024-10-04 | Stop reason: HOSPADM

## 2024-10-03 RX ORDER — PROCHLORPERAZINE MALEATE 10 MG
10 TABLET ORAL EVERY 6 HOURS PRN
Status: DISCONTINUED | OUTPATIENT
Start: 2024-10-03 | End: 2024-10-04 | Stop reason: HOSPADM

## 2024-10-03 RX ORDER — SODIUM CHLORIDE 9 MG/ML
150 INJECTION, SOLUTION INTRAVENOUS CONTINUOUS
Status: DISCONTINUED | OUTPATIENT
Start: 2024-10-03 | End: 2024-10-04 | Stop reason: HOSPADM

## 2024-10-03 RX ORDER — ACETAMINOPHEN 500 MG
1000 TABLET ORAL EVERY 8 HOURS SCHEDULED
Status: DISCONTINUED | OUTPATIENT
Start: 2024-10-03 | End: 2024-10-04 | Stop reason: HOSPADM

## 2024-10-03 RX ORDER — PROMETHAZINE HYDROCHLORIDE 12.5 MG/1
12.5 TABLET ORAL EVERY 6 HOURS PRN
Status: DISCONTINUED | OUTPATIENT
Start: 2024-10-03 | End: 2024-10-04 | Stop reason: HOSPADM

## 2024-10-03 RX ORDER — ENOXAPARIN SODIUM 100 MG/ML
INJECTION SUBCUTANEOUS AS NEEDED
Status: DISCONTINUED | OUTPATIENT
Start: 2024-10-03 | End: 2024-10-03 | Stop reason: HOSPADM

## 2024-10-03 RX ORDER — SCOLOPAMINE TRANSDERMAL SYSTEM 1 MG/1
1 PATCH, EXTENDED RELEASE TRANSDERMAL ONCE
Status: DISCONTINUED | OUTPATIENT
Start: 2024-10-03 | End: 2024-10-04 | Stop reason: HOSPADM

## 2024-10-03 RX ORDER — ONDANSETRON 2 MG/ML
INJECTION INTRAMUSCULAR; INTRAVENOUS AS NEEDED
Status: DISCONTINUED | OUTPATIENT
Start: 2024-10-03 | End: 2024-10-03 | Stop reason: SURG

## 2024-10-03 RX ORDER — ACETAMINOPHEN 160 MG/5ML
1000 SOLUTION ORAL EVERY 8 HOURS SCHEDULED
Status: DISCONTINUED | OUTPATIENT
Start: 2024-10-03 | End: 2024-10-04 | Stop reason: HOSPADM

## 2024-10-03 RX ORDER — PROCHLORPERAZINE EDISYLATE 5 MG/ML
10 INJECTION INTRAMUSCULAR; INTRAVENOUS EVERY 6 HOURS PRN
Status: DISCONTINUED | OUTPATIENT
Start: 2024-10-03 | End: 2024-10-04 | Stop reason: HOSPADM

## 2024-10-03 RX ORDER — DIPHENHYDRAMINE HYDROCHLORIDE 50 MG/ML
25 INJECTION INTRAMUSCULAR; INTRAVENOUS EVERY 4 HOURS PRN
Status: DISCONTINUED | OUTPATIENT
Start: 2024-10-03 | End: 2024-10-04 | Stop reason: HOSPADM

## 2024-10-03 RX ORDER — HYDRALAZINE HYDROCHLORIDE 20 MG/ML
10 INJECTION INTRAMUSCULAR; INTRAVENOUS
Status: DISCONTINUED | OUTPATIENT
Start: 2024-10-03 | End: 2024-10-04 | Stop reason: HOSPADM

## 2024-10-03 RX ORDER — MAGNESIUM HYDROXIDE 1200 MG/15ML
LIQUID ORAL AS NEEDED
Status: DISCONTINUED | OUTPATIENT
Start: 2024-10-03 | End: 2024-10-03 | Stop reason: HOSPADM

## 2024-10-03 RX ORDER — PANTOPRAZOLE SODIUM 40 MG/10ML
40 INJECTION, POWDER, LYOPHILIZED, FOR SOLUTION INTRAVENOUS ONCE
Status: COMPLETED | OUTPATIENT
Start: 2024-10-03 | End: 2024-10-03

## 2024-10-03 RX ORDER — SODIUM CHLORIDE 0.9 % (FLUSH) 0.9 %
1-10 SYRINGE (ML) INJECTION AS NEEDED
Status: DISCONTINUED | OUTPATIENT
Start: 2024-10-03 | End: 2024-10-04 | Stop reason: HOSPADM

## 2024-10-03 RX ORDER — NICOTINE POLACRILEX 4 MG
15 LOZENGE BUCCAL
Status: DISCONTINUED | OUTPATIENT
Start: 2024-10-03 | End: 2024-10-04 | Stop reason: HOSPADM

## 2024-10-03 RX ADMIN — OXYCODONE HYDROCHLORIDE 5 MG: 5 TABLET ORAL at 18:24

## 2024-10-03 RX ADMIN — PANTOPRAZOLE SODIUM 40 MG: 40 INJECTION, POWDER, FOR SOLUTION INTRAVENOUS at 06:48

## 2024-10-03 RX ADMIN — FENTANYL CITRATE 50 MCG: 50 INJECTION, SOLUTION INTRAMUSCULAR; INTRAVENOUS at 07:36

## 2024-10-03 RX ADMIN — DEXAMETHASONE SODIUM PHOSPHATE 4 MG: 4 INJECTION, SOLUTION INTRA-ARTICULAR; INTRALESIONAL; INTRAMUSCULAR; INTRAVENOUS; SOFT TISSUE at 08:01

## 2024-10-03 RX ADMIN — MIDAZOLAM HYDROCHLORIDE 2 MG: 1 INJECTION, SOLUTION INTRAMUSCULAR; INTRAVENOUS at 07:36

## 2024-10-03 RX ADMIN — SUGAMMADEX 286 MG: 100 INJECTION, SOLUTION INTRAVENOUS at 09:42

## 2024-10-03 RX ADMIN — SODIUM CHLORIDE 1000 ML: 9 INJECTION, SOLUTION INTRAVENOUS at 06:47

## 2024-10-03 RX ADMIN — SODIUM CHLORIDE 3000 MG: 9 INJECTION, SOLUTION INTRAVENOUS at 07:49

## 2024-10-03 RX ADMIN — GABAPENTIN 100 MG: 100 CAPSULE ORAL at 21:14

## 2024-10-03 RX ADMIN — THIAMINE HYDROCHLORIDE 100 MG: 200 INJECTION, SOLUTION INTRAMUSCULAR; INTRAVENOUS at 14:09

## 2024-10-03 RX ADMIN — Medication 10 MG: at 07:44

## 2024-10-03 RX ADMIN — CHLORHEXIDINE GLUCONATE 0.12% ORAL RINSE 15 ML: 1.2 LIQUID ORAL at 06:42

## 2024-10-03 RX ADMIN — CHLORHEXIDINE GLUCONATE 0.12% ORAL RINSE 15 ML: 1.2 LIQUID ORAL at 06:47

## 2024-10-03 RX ADMIN — ACETAMINOPHEN 1000 MG: 500 TABLET, FILM COATED ORAL at 21:14

## 2024-10-03 RX ADMIN — GABAPENTIN 100 MG: 100 CAPSULE ORAL at 14:09

## 2024-10-03 RX ADMIN — Medication 40 MG: at 07:50

## 2024-10-03 RX ADMIN — Medication 10 ML: at 14:09

## 2024-10-03 RX ADMIN — HYDROMORPHONE HYDROCHLORIDE 0.5 MG: 1 INJECTION, SOLUTION INTRAMUSCULAR; INTRAVENOUS; SUBCUTANEOUS at 14:04

## 2024-10-03 RX ADMIN — ONDANSETRON 4 MG: 2 INJECTION INTRAMUSCULAR; INTRAVENOUS at 08:01

## 2024-10-03 RX ADMIN — PROPOFOL 200 MG: 10 INJECTION, EMULSION INTRAVENOUS at 07:44

## 2024-10-03 RX ADMIN — AMISULPRIDE 10 MG: 2.5 INJECTION, SOLUTION INTRAVENOUS at 09:09

## 2024-10-03 RX ADMIN — SODIUM CHLORIDE 3000 MG: 9 INJECTION, SOLUTION INTRAVENOUS at 17:05

## 2024-10-03 RX ADMIN — Medication 100 MG: at 07:44

## 2024-10-03 RX ADMIN — SODIUM CHLORIDE 3000 MG: 9 INJECTION, SOLUTION INTRAVENOUS at 23:48

## 2024-10-03 RX ADMIN — GLUCAGON 1 MG: KIT at 09:00

## 2024-10-03 RX ADMIN — DEXAMETHASONE SODIUM PHOSPHATE 8 MG: 10 INJECTION, SOLUTION INTRAMUSCULAR; INTRAVENOUS at 07:48

## 2024-10-03 RX ADMIN — SODIUM CHLORIDE: 9 INJECTION, SOLUTION INTRAVENOUS at 07:32

## 2024-10-03 RX ADMIN — Medication 20 MG: at 08:17

## 2024-10-03 RX ADMIN — HYDROMORPHONE HYDROCHLORIDE 0.5 MG: 1 INJECTION, SOLUTION INTRAMUSCULAR; INTRAVENOUS; SUBCUTANEOUS at 10:35

## 2024-10-03 RX ADMIN — BUPIVACAINE HYDROCHLORIDE 30 ML: 2.5 INJECTION, SOLUTION EPIDURAL; INFILTRATION; INTRACAUDAL; PERINEURAL at 07:48

## 2024-10-03 RX ADMIN — Medication 20 MG: at 08:51

## 2024-10-03 RX ADMIN — ACETAMINOPHEN 1000 MG: 500 TABLET, FILM COATED ORAL at 06:41

## 2024-10-03 RX ADMIN — ACETAMINOPHEN 1000 MG: 500 TABLET, FILM COATED ORAL at 14:09

## 2024-10-03 RX ADMIN — GABAPENTIN 600 MG: 300 CAPSULE ORAL at 06:41

## 2024-10-03 RX ADMIN — SCOPOLAMINE 1 PATCH: 1.5 PATCH, EXTENDED RELEASE TRANSDERMAL at 06:43

## 2024-10-03 RX ADMIN — ONDANSETRON 4 MG: 2 INJECTION INTRAMUSCULAR; INTRAVENOUS at 14:14

## 2024-10-03 RX ADMIN — CETIRIZINE HYDROCHLORIDE 10 MG: 10 TABLET, FILM COATED ORAL at 14:09

## 2024-10-03 RX ADMIN — SODIUM CHLORIDE: 9 INJECTION, SOLUTION INTRAVENOUS at 09:06

## 2024-10-03 RX ADMIN — SODIUM CHLORIDE, POTASSIUM CHLORIDE, SODIUM LACTATE AND CALCIUM CHLORIDE 150 ML/HR: 600; 310; 30; 20 INJECTION, SOLUTION INTRAVENOUS at 11:04

## 2024-10-03 RX ADMIN — HYDROMORPHONE HYDROCHLORIDE 0.5 MG: 2 INJECTION, SOLUTION INTRAMUSCULAR; INTRAVENOUS; SUBCUTANEOUS at 09:42

## 2024-10-03 NOTE — PROGRESS NOTES
"Pharmacy Consult - Lovenox Dosing  Caity Hammond is a 54 y.o. female who has been ordered Lovenox for VTE prophylaxis following procedure.    Allergies  Latex    Relevant clinical data and objective history reviewed:  Ht: 68.5\"  Wt: 143 kg  BMI: 47.2 kg/m2    Results from last 7 days   Lab Units 09/30/24  1753   HEMOGLOBIN g/dL 13.5   HEMATOCRIT % 43.7   PLATELETS 10*3/mm3 247   CREATININE mg/dL 0.68       Asessment/Plan:  Will initiate Lovenox 40 mg SQ Q12H starting POD 1. Nursing instructions to wait to administer dose until AM Hgb resulted and hold parameters added to administration instructions per Dr. Augustine.  Pharmacy will monitor Ms. Hammond's renal function and clinical status and adjust the Lovenox dose and/or frequency as needed.    Robby Finnegan, PharmD  10/3/2024  10:24 EDT      "

## 2024-10-03 NOTE — ANESTHESIA POSTPROCEDURE EVALUATION
Patient: Caity Hammond    Procedure Summary       Date: 10/03/24 Room / Location: [facility] OR 2 /  [facility]    Anesthesia Start: 0732 Anesthesia Stop: 0952    Procedure: GASTRIC SLEEVE WITH HIATAL HERNIA LAPAROSCOPIC WITH DAVINCI ROBOT (Abdomen) Diagnosis:       Obesity, Class III, BMI 40-49.9 (morbid obesity)      Gastroesophageal reflux disease, unspecified whether esophagitis present      (Obesity, Class III, BMI 40-49.9 (morbid obesity) [E66.01])      (Gastroesophageal reflux disease, unspecified whether esophagitis present [K21.9])    Surgeons: Tracy Augustine MD Provider: Jenna Plummer CRNA    Anesthesia Type: general with block ASA Status: 2            Anesthesia Type: general with block    Vitals  Vitals Value Taken Time   /85 10/03/24 1200   Temp 97.7 °F (36.5 °C) 10/03/24 0955   Pulse 73 10/03/24 1222   Resp 18 10/03/24 1200   SpO2 96 % 10/03/24 1222   Vitals shown include unfiled device data.        Post Anesthesia Care and Evaluation    Patient location during evaluation: PACU  Patient participation: complete - patient participated  Level of consciousness: awake and alert  Pain score: 2  Pain management: satisfactory to patient    Airway patency: patent  Anesthetic complications: No anesthetic complications  PONV Status: none  Cardiovascular status: acceptable and stable  Respiratory status: acceptable  Hydration status: acceptable    Comments: Vitals signs as noted in nursing documentation as per protocol.

## 2024-10-03 NOTE — PAYOR COMM NOTE
"TO:BC  FROM:YOAHN VINSON, RN PHONE [phone]  CLINICALS [id number]    Caity Hammond (54 y.o. Female)       Date of Birth   1970    Social Security Number       Address   [address]  [place] [address]    Home Phone   [phone]    MRN   [id number]       Gnosticism   Sikhism    Marital Status                               Admission Date   10/3/24    Admission Type   Elective    Admitting Provider   Tracy Augustine MD    Attending Provider   Tracy Augustine MD    Department, Room/Bed   [facility] TELEMETRY 3, 304/1       Discharge Date       Discharge Disposition       Discharge Destination                                 Attending Provider: Tracy Augustine MD    Allergies: Latex    Isolation: None   Infection: None   Code Status: CPR    Ht: 174 cm (68.5\")   Wt: 143 kg (315 lb 4.1 oz)    Admission Cmt: None   Principal Problem: Obesity, Class III, BMI 40-49.9 (morbid obesity) [E66.01]                   Active Insurance as of 10/3/2024       Primary Coverage       Payor Plan Insurance Group Employer/Plan Group    [facility] [employer] CR[employer] [id number]       Payor Plan Address Payor Plan Phone Number Payor Plan Fax Number Effective Dates    [address] [phone]  8/1/2023 - None Entered    [place] [address]         Subscriber Name Subscriber Birth Date Member ID       CAITY HAMMOND 1970 [id number]                     Emergency Contacts        (Rel.) Home Phone Work Phone Mobile Phone    GIUSEPPEMILI (Spouse) [phone] -- [phone]                 History & Physical        Tracy Augustine MD at 10/03/24 0634          H&P reviewed.  The patient was examined and there are no changes to the H&P  Electronically signed by Tracy Augustine MD at 10/03/24 0634   Source Note: H&P (View-Only)          [facility] BARIATRIC SURGERY  [address] " [address] [address] [phone]      Patient  Name:  Caity Hammond  :  1970      Date of Visit: 24      Chief Complaint:  weight gain; unable to maintain weight loss    History of Present Illness:  Caity Hammond is a 54 y.o. female who presents today for evaluation, education and consultation regarding weight loss surgery.     Patient has been overweight for many years, with numerous failed dietary/weight loss attempts.  She now has obesity related comorbidities of GERD, HLD, prediabetes and as such has decided to pursue weight loss surgery.         24 Update:    The patient lives in [place], and is an /stat .  Her mother had Pao en Y with Dr. Rodríguez 19 years ago.  Reports she has done well.      Takes Prilosec 20 mg PRN, Tums PRN.    No personal or family hx of VTE or clotting d/o.  No liver, lung, heart, or renal disease      Review of data:    ELLYN: n/a  CBC: nl  CMP: nl  HP neg         EGD: 24 PQ, GEJ 39 cm, +sliding HH  Path-  Final Diagnosis   1.  GASTRIC ANTRUM BIOPSY:  Minimal chronic gastritis without activity.  Negative for intestinal metaplasia and dysplasia.  No Helicobacter pylori-like organisms identified on routine stains.     2.  DISTAL ESOPHAGUS AT 38 CM, BIOPSY:  Squamous mucosa with no significant histopathologic change.  Negative for intestinal metaplasia, dysplasia, intraepithelial eosinophils and glandular mucosa.       Cardiac clearance: no additional testing Dr. Hardy    Last tobacco: n/a  Last NSAIDs: n/a  Last ASA: n/a  Last steroids: n/a  Last hormones: n/a      Past Medical History:   Diagnosis Date    Dyspepsia     Fatigue     GERD (gastroesophageal reflux disease)     prn PPI, no recent eval    Gout     Hyperlipidemia     Morbid obesity     Peptic ulceration     UGI bleed @age 6    Postoperative nausea     Prediabetes     RLS (restless legs syndrome)      Past Surgical History:   Procedure Laterality Date      SECTION       SECTION WITH TUBAL  2005    WISDOM TOOTH EXTRACTION Bilateral 1990       Allergies   Allergen Reactions    Latex Rash       Current Outpatient Medications:     B Complex Vitamins (vitamin b complex) capsule capsule, Take  by mouth Daily., Disp: , Rfl:     Berberine Chloride (BERBERINE HCI PO), Take  by mouth., Disp: , Rfl:     doxylamine (UNISOM) 25 MG tablet, Take 1 tablet by mouth At Night As Needed for Sleep., Disp: , Rfl:     loratadine (Claritin) 10 MG tablet, Take 1 tablet by mouth Daily., Disp: , Rfl:     magnesium oxide (MAG-OX) 400 MG tablet, Take 1 tablet by mouth Daily., Disp: , Rfl:     multivitamin with minerals tablet tablet, Take 1 tablet by mouth Daily., Disp: , Rfl:     omeprazole (priLOSEC) 20 MG capsule, Take 1 capsule by mouth As Needed., Disp: , Rfl:     Vitamin D-Vitamin K (D3 + K2) 125-100 MCG capsule, Take  by mouth., Disp: , Rfl:     Social History     Socioeconomic History    Marital status:    Tobacco Use    Smoking status: Never    Smokeless tobacco: Never   Vaping Use    Vaping status: Never Used   Substance and Sexual Activity    Alcohol use: Never    Drug use: Never    Sexual activity: Yes     Partners: Male     Birth control/protection: None     Comment:  only     Social History     Social History Narrative    Lives in [place], [place].   w/ 2 adult kids.  Works for [place] [employer] -  / .         Family History   Problem Relation Age of Onset    Obesity Mother         had gastric bypass surgery    Hyperlipidemia Mother     Hypertension Mother     Diabetes type II Father 55        passed at 73    Hypertension Father     Alzheimer's disease Father     Kidney disease Father         agent orange [place]    Sleep apnea Father     Hypertension Brother     Obesity Brother     Hyperlipidemia Brother     Sleep apnea Brother     Alzheimer's disease Maternal Grandmother     Stroke Maternal Grandmother     Prostate cancer  Maternal Grandfather 93    Skin cancer Maternal Grandfather     Obesity Paternal Grandmother     Hypertension Paternal Grandmother     Alzheimer's disease Paternal Grandmother     Prostate cancer Paternal Grandfather 87    Obesity Paternal Grandfather     Ovarian cancer Neg Hx     Breast cancer Neg Hx        Review of Systems    Physical Exam:  Vital Signs:  Weight: (!) 143 kg (315 lb)   Body mass index is 47.2 kg/m².  Temp: 97.8 °F (36.6 °C)   Heart Rate: 86   BP: 134/84     Physical Exam  Vitals reviewed.   Constitutional:       Appearance: She is well-developed.   HENT:      Head: Normocephalic and atraumatic.      Nose: Nose normal.   Eyes:      Conjunctiva/sclera: Conjunctivae normal.      Pupils: Pupils are equal, round, and reactive to light.   Neck:      Thyroid: No thyromegaly.      Vascular: No carotid bruit.      Trachea: No tracheal deviation.   Cardiovascular:      Rate and Rhythm: Normal rate and regular rhythm.      Heart sounds: Normal heart sounds.   Pulmonary:      Effort: Pulmonary effort is normal. No respiratory distress.      Breath sounds: Normal breath sounds.   Abdominal:      General: There is no distension.      Palpations: Abdomen is soft.      Tenderness: There is no abdominal tenderness.      Comments: Low transverse C section   Musculoskeletal:         General: No deformity. Normal range of motion.      Cervical back: Normal range of motion and neck supple.      Right lower leg: Edema present.      Left lower leg: Edema present.   Skin:     General: Skin is warm and dry.      Findings: No rash.   Neurological:      Mental Status: She is alert and oriented to person, place, and time.      Cranial Nerves: No cranial nerve deficit.      Coordination: Coordination normal.   Psychiatric:         Behavior: Behavior normal.         Thought Content: Thought content normal.         Judgment: Judgment normal.         Problem List Items Addressed This Visit       Prediabetes (Chronic)    Mixed  hyperlipidemia (Chronic)    GERD (gastroesophageal reflux disease)    Overview     prn PPI, no recent eval          Other Visit Diagnoses       Obesity, Class III, BMI 40-49.9 (morbid obesity)    -  Primary            Assessment:    Caity Hammond is a 54 y.o. year old female with medically complicated obesity.    Weight loss surgery is deemed medically necessary given the following obesity related comorbidities including GERD, HLD, prediabetes  with current Weight: (!) 143 kg (315 lb) and Body mass index is 47.2 kg/m²..    Patient is aware that surgery is a tool, and that weight loss is not guaranteed but only seen in the context of appropriate use, follow up and exercise.    The patient was present for an approximately a 2.5 hour discussion of the purpose of weight loss surgery, how WLS is a tool to assist in achieving weight loss goals, the most common complications and how best to avoid them, and the strategies for short and long term weight loss.  Ample opportunity to discuss questions was available both in group and during the time of individual examination.    I reviewed all available preop labs, Xrays, tests, clearances, etc and signed off on these in the record.  All of this in addition to the patient's unique history and exam has been taken into consideration in determining their appropriate candidacy for weight loss surgery.    Complications  of laparoscopic/possible robotic gastric sleeve were discussed. The patient is well aware of the potential complications of surgery that include but not limited to bleeding, infections, deep venous thrombosis, pulmonary embolism, pulmonary complications such as pneumonia, cardiac events, hernias, small bowel obstruction, damage to the spleen or other organs, bowel injury, disfiguring scars, failure to lose weight, need for additional surgery, conversion to an open procedure, and death. Patient is also aware of complications which apply in this particular  "procedure that can include but are not limited to a \"leak\" at the staple line which in some instances may require conversion to gastric bypass.    The patient is aware if a hiatal hernia is encountered, it likely will be repaired.  R/B/A Rx to hiatal hernia repair were discussed as outlined in our long consent form.  Briefly risks in addition to those for LSG include recurrent hernia, SALO, dysphagia, esophageal injury, pneumothorax, injury to the vagus nerves, injury to the thoracic duct, aorta or vena cava.    Greater than 3 minutes was spent with the patient discussing avoiding all tobacco products and second hand smoke at least 2 weeks pre-operatively and 6 weeks post-operatively to minimize the risk of sleeve leak.  This included discussing the importance of avoiding even secondhand smoke as the risk of leak is increased.  Examples discussed:  I made it very clear that the patient understands they should avoid even riding in a car where someone has previously smoked in the last 2 weeks, living in a house where someone smokes (even if it's in a separate room/patio/attached garage, etc.) we discussed that they should not have a conversation with a group of people who are smoking even if it's outside.  They can be around wood burning fires and barbecue.  I told them I do not know if marijuana has a same effects but my overall recommendation is to avoid it for 2 weeks prior in 6 weeks after surgery.  They also are aware that nicotine may also increase the risk of leak and I strongly encouraged him to avoid that as well for 2 weeks prior in 6 weeks after surgery.    Discussed the risks, benefits and alternative therapies at great length as outlined in our extensive consent forms, consent videos, and educational teaching process under the direction of the center's .    A copy of the patient's signed informed consent is on file.    Plan:  Laparoscopic , possible robotic assisted sleeve gastrectomy + " HHR      R/B/A Rx discussed to postop anticoagulation including but not limited to bleeding, drug reaction, venothromboembolic events, etc. and she declined.    MDM high:  Elective procedure with the following risk factors: morbid obesity,  4+ chronic medical problems reviewed.    I spent 60 minutes caring for Caity on this date of service. This time includes time spent by me in the following activities: preparing for the visit, reviewing tests, performing a medically appropriate examination and/or evaluation, counseling and educating the patient/family/caregiver, referring and communicating with other health care professionals, documenting information in the medical record, independently interpreting results and communicating that information with the patient/family/caregiver, care coordination, ordering medications, ordering test(s), ordering procedure(s), obtaining a separately obtained history, and reviewing a separately obtained history.       Thank you Radha Madden APRN for allowing me to share in the care of our mutual patient.             Tracy Augustine MD                                             Electronically signed by Tracy Augustine MD at 09/10/24 9596                 Tracy Augustine MD at 24 1300          [facility] BARIATRIC SURGERY  [address] [address]  [phone]      Patient  Name:  Caity Hammond  :  1970      Date of Visit: 24      Chief Complaint:  weight gain; unable to maintain weight loss    History of Present Illness:  Caity Hammond is a 54 y.o. female who presents today for evaluation, education and consultation regarding weight loss surgery.     Patient has been overweight for many years, with numerous failed dietary/weight loss attempts.  She now has obesity related comorbidities of GERD, HLD, prediabetes and as such has decided to pursue weight loss surgery.         24  Update:    The patient lives in [place], and is an /stat .  Her mother had Pao en Y with Dr. Rodríguez 19 years ago.  Reports she has done well.      Takes Prilosec 20 mg PRN, Tums PRN.    No personal or family hx of VTE or clotting d/o.  No liver, lung, heart, or renal disease      Review of data:    ELLYN: n/a  CBC: nl  CMP: nl  HP neg         EGD: 24 PQ, GEJ 39 cm, +sliding HH  Path-  Final Diagnosis   1.  GASTRIC ANTRUM BIOPSY:  Minimal chronic gastritis without activity.  Negative for intestinal metaplasia and dysplasia.  No Helicobacter pylori-like organisms identified on routine stains.     2.  DISTAL ESOPHAGUS AT 38 CM, BIOPSY:  Squamous mucosa with no significant histopathologic change.  Negative for intestinal metaplasia, dysplasia, intraepithelial eosinophils and glandular mucosa.       Cardiac clearance: no additional testing Dr. Hardy    Last tobacco: n/a  Last NSAIDs: n/a  Last ASA: n/a  Last steroids: n/a  Last hormones: n/a      Past Medical History:   Diagnosis Date    Dyspepsia     Fatigue     GERD (gastroesophageal reflux disease)     prn PPI, no recent eval    Gout     Hyperlipidemia     Morbid obesity     Peptic ulceration     UGI bleed @age 6    Postoperative nausea     Prediabetes     RLS (restless legs syndrome)      Past Surgical History:   Procedure Laterality Date     SECTION       SECTION WITH TUBAL  2005    WISDOM TOOTH EXTRACTION Bilateral        Allergies   Allergen Reactions    Latex Rash       Current Outpatient Medications:     B Complex Vitamins (vitamin b complex) capsule capsule, Take  by mouth Daily., Disp: , Rfl:     Berberine Chloride (BERBERINE HCI PO), Take  by mouth., Disp: , Rfl:     doxylamine (UNISOM) 25 MG tablet, Take 1 tablet by mouth At Night As Needed for Sleep., Disp: , Rfl:     loratadine (Claritin) 10 MG tablet, Take 1 tablet by mouth Daily., Disp: , Rfl:     magnesium oxide (MAG-OX) 400 MG tablet, Take 1 tablet  by mouth Daily., Disp: , Rfl:     multivitamin with minerals tablet tablet, Take 1 tablet by mouth Daily., Disp: , Rfl:     omeprazole (priLOSEC) 20 MG capsule, Take 1 capsule by mouth As Needed., Disp: , Rfl:     Vitamin D-Vitamin K (D3 + K2) 125-100 MCG capsule, Take  by mouth., Disp: , Rfl:     Social History     Socioeconomic History    Marital status:    Tobacco Use    Smoking status: Never    Smokeless tobacco: Never   Vaping Use    Vaping status: Never Used   Substance and Sexual Activity    Alcohol use: Never    Drug use: Never    Sexual activity: Yes     Partners: Male     Birth control/protection: None     Comment:  only     Social History     Social History Narrative    Lives in [place], [place].   w/ 2 adult kids.  Works for [place] [email or web address] -  / .         Family History   Problem Relation Age of Onset    Obesity Mother         had gastric bypass surgery    Hyperlipidemia Mother     Hypertension Mother     Diabetes type II Father 55        passed at 73    Hypertension Father     Alzheimer's disease Father     Kidney disease Father         agent orange [place]    Sleep apnea Father     Hypertension Brother     Obesity Brother     Hyperlipidemia Brother     Sleep apnea Brother     Alzheimer's disease Maternal Grandmother     Stroke Maternal Grandmother     Prostate cancer Maternal Grandfather 93    Skin cancer Maternal Grandfather     Obesity Paternal Grandmother     Hypertension Paternal Grandmother     Alzheimer's disease Paternal Grandmother     Prostate cancer Paternal Grandfather 87    Obesity Paternal Grandfather     Ovarian cancer Neg Hx     Breast cancer Neg Hx        Review of Systems    Physical Exam:  Vital Signs:  Weight: (!) 143 kg (315 lb)   Body mass index is 47.2 kg/m².  Temp: 97.8 °F (36.6 °C)   Heart Rate: 86   BP: 134/84     Physical Exam  Vitals reviewed.   Constitutional:       Appearance: She is well-developed.   HENT:      Head: Normocephalic and  atraumatic.      Nose: Nose normal.   Eyes:      Conjunctiva/sclera: Conjunctivae normal.      Pupils: Pupils are equal, round, and reactive to light.   Neck:      Thyroid: No thyromegaly.      Vascular: No carotid bruit.      Trachea: No tracheal deviation.   Cardiovascular:      Rate and Rhythm: Normal rate and regular rhythm.      Heart sounds: Normal heart sounds.   Pulmonary:      Effort: Pulmonary effort is normal. No respiratory distress.      Breath sounds: Normal breath sounds.   Abdominal:      General: There is no distension.      Palpations: Abdomen is soft.      Tenderness: There is no abdominal tenderness.      Comments: Low transverse C section   Musculoskeletal:         General: No deformity. Normal range of motion.      Cervical back: Normal range of motion and neck supple.      Right lower leg: Edema present.      Left lower leg: Edema present.   Skin:     General: Skin is warm and dry.      Findings: No rash.   Neurological:      Mental Status: She is alert and oriented to person, place, and time.      Cranial Nerves: No cranial nerve deficit.      Coordination: Coordination normal.   Psychiatric:         Behavior: Behavior normal.         Thought Content: Thought content normal.         Judgment: Judgment normal.         Problem List Items Addressed This Visit       Prediabetes (Chronic)    Mixed hyperlipidemia (Chronic)    GERD (gastroesophageal reflux disease)    Overview     prn PPI, no recent eval          Other Visit Diagnoses       Obesity, Class III, BMI 40-49.9 (morbid obesity)    -  Primary            Assessment:    Caity Hammond is a 54 y.o. year old female with medically complicated obesity.    Weight loss surgery is deemed medically necessary given the following obesity related comorbidities including GERD, HLD, prediabetes  with current Weight: (!) 143 kg (315 lb) and Body mass index is 47.2 kg/m²..    Patient is aware that surgery is a tool, and that weight loss is not  "guaranteed but only seen in the context of appropriate use, follow up and exercise.    The patient was present for an approximately a 2.5 hour discussion of the purpose of weight loss surgery, how WLS is a tool to assist in achieving weight loss goals, the most common complications and how best to avoid them, and the strategies for short and long term weight loss.  Ample opportunity to discuss questions was available both in group and during the time of individual examination.    I reviewed all available preop labs, Xrays, tests, clearances, etc and signed off on these in the record.  All of this in addition to the patient's unique history and exam has been taken into consideration in determining their appropriate candidacy for weight loss surgery.    Complications  of laparoscopic/possible robotic gastric sleeve were discussed. The patient is well aware of the potential complications of surgery that include but not limited to bleeding, infections, deep venous thrombosis, pulmonary embolism, pulmonary complications such as pneumonia, cardiac events, hernias, small bowel obstruction, damage to the spleen or other organs, bowel injury, disfiguring scars, failure to lose weight, need for additional surgery, conversion to an open procedure, and death. Patient is also aware of complications which apply in this particular procedure that can include but are not limited to a \"leak\" at the staple line which in some instances may require conversion to gastric bypass.    The patient is aware if a hiatal hernia is encountered, it likely will be repaired.  R/B/A Rx to hiatal hernia repair were discussed as outlined in our long consent form.  Briefly risks in addition to those for LSG include recurrent hernia, SALO, dysphagia, esophageal injury, pneumothorax, injury to the vagus nerves, injury to the thoracic duct, aorta or vena cava.    Greater than 3 minutes was spent with the patient discussing avoiding all tobacco products and " second hand smoke at least 2 weeks pre-operatively and 6 weeks post-operatively to minimize the risk of sleeve leak.  This included discussing the importance of avoiding even secondhand smoke as the risk of leak is increased.  Examples discussed:  I made it very clear that the patient understands they should avoid even riding in a car where someone has previously smoked in the last 2 weeks, living in a house where someone smokes (even if it's in a separate room/patio/attached garage, etc.) we discussed that they should not have a conversation with a group of people who are smoking even if it's outside.  They can be around wood burning fires and barbecue.  I told them I do not know if marijuana has a same effects but my overall recommendation is to avoid it for 2 weeks prior in 6 weeks after surgery.  They also are aware that nicotine may also increase the risk of leak and I strongly encouraged him to avoid that as well for 2 weeks prior in 6 weeks after surgery.    Discussed the risks, benefits and alternative therapies at great length as outlined in our extensive consent forms, consent videos, and educational teaching process under the direction of the center's .    A copy of the patient's signed informed consent is on file.    Plan:  Laparoscopic , possible robotic assisted sleeve gastrectomy + HHR      R/B/A Rx discussed to postop anticoagulation including but not limited to bleeding, drug reaction, venothromboembolic events, etc. and she declined.    MDM high:  Elective procedure with the following risk factors: morbid obesity,  4+ chronic medical problems reviewed.    I spent 60 minutes caring for Caity on this date of service. This time includes time spent by me in the following activities: preparing for the visit, reviewing tests, performing a medically appropriate examination and/or evaluation, counseling and educating the patient/family/caregiver, referring and communicating with other  health care professionals, documenting information in the medical record, independently interpreting results and communicating that information with the patient/family/caregiver, care coordination, ordering medications, ordering test(s), ordering procedure(s), obtaining a separately obtained history, and reviewing a separately obtained history.       Thank you Radha Madden APRN for allowing me to share in the care of our mutual patient.             Tracy Augustine MD                                             Electronically signed by Tracy Augustine MD at 09/10/24 2927       Vital Signs (last day)       Date/Time Temp Temp src Pulse Resp BP Patient Position SpO2    10/03/24 1327 98 (36.7) Oral 75 16 156/90 Lying 98    10/03/24 1300 -- -- 74 18 138/83 -- 97    10/03/24 1255 96.8 (36) Tympanic 75 19 147/87 -- 97    10/03/24 1230 -- -- 72 17 148/88 -- 96    10/03/24 1200 -- -- 72 18 146/85 Lying 96    10/03/24 1130 -- -- 69 16 146/88 Lying 98    10/03/24 1110 -- -- 71 17 148/87 Lying 95    10/03/24 1100 -- -- 67 12 143/92 Lying 96    10/03/24 1050 -- -- 71 14 150/84 Lying 95    10/03/24 1047 -- -- 67 -- -- -- 93    10/03/24 1046 -- -- 72 -- -- -- 87    10/03/24 1045 -- -- -- -- -- -- 90    10/03/24 1044 -- -- -- -- -- -- 94    10/03/24 1040 -- -- 74 -- 141/82 -- 91    10/03/24 1030 -- -- 74 18 150/84 Lying 91    10/03/24 1020 -- -- 74 17 145/80 Lying 99    10/03/24 1010 -- -- 75 17 149/83 Lying 97    10/03/24 1005 -- -- 75 18 145/84 Lying 97    10/03/24 1000 -- -- 76 17 136/83 Lying 97    10/03/24 0955 97.7 (36.5) Temporal 75 18 136/79 Lying 97    10/03/24 0954 -- -- 75 -- -- -- 97    10/03/24 0627 97.1 (36.2) Tympanic 77 16 148/92 Sitting 95          Current Facility-Administered Medications   Medication Dose Route Frequency Provider Last Rate Last Admin    acetaminophen (TYLENOL) tablet 1,000 mg  1,000 mg Oral Q8H Tracy Augustine MD        Or    acetaminophen (TYLENOL) 160 MG/5ML oral  solution 1,000 mg  1,000 mg Oral Q8H Tracy Augustine MD        ALPRAZolam (XANAX) tablet 0.25 mg  0.25 mg Oral BID PRN Tracy Augustine MD        ceFAZolin 3000 mg IVPB in 100 mL NS (VTB)  3,000 mg Intravenous Q8H Tracy Augustine MD        cetirizine (zyrTEC) tablet 10 mg  10 mg Oral Daily Tracy Augustine MD        [START ON 10/4/2024] cyanocobalamin injection 1,000 mcg  1,000 mcg Intramuscular Once Tracy Augustine MD        dextrose (D50W) (25 g/50 mL) IV injection 25 g  25 g Intravenous Q15 Min PRN Tracy Augustine MD        dextrose (GLUTOSE) oral gel 15 g  15 g Oral Q15 Min PRN Tracy Augustine MD        diphenhydrAMINE (BENADRYL) injection 25 mg  25 mg Intravenous Q4H PRN Tracy Augustine MD        [START ON 10/4/2024] Enoxaparin Sodium (LOVENOX) syringe 40 mg  40 mg Subcutaneous Q12H Robby Finnegan [facility]        [START ON 10/4/2024] ferric gluconate (FERRLECIT) 125 mg in sodium chloride 0.9 % 100 mL IVPB  125 mg Intravenous Once PRN Tracy Augustine MD        gabapentin (NEURONTIN) capsule 100 mg  100 mg Oral TID Tracy Augustine MD        Or    gabapentin (NEURONTIN) 50 mg/mL solution 100 mg  100 mg Oral TID Tracy Augustine MD        glucagon (GLUCAGEN) injection 1 mg  1 mg Intramuscular Q15 Min PRN Tracy Augustine MD        hydrALAZINE (APRESOLINE) injection 10 mg  10 mg Intravenous Q30 Min PRN Tracy Augustine MD        HYDROmorphone (DILAUDID) injection 0.5 mg  0.5 mg Intravenous Q2H PRN Tracy Augustine MD        And    naloxone (NARCAN) injection 0.1 mg  0.1 mg Intravenous Q5 Min PRN Tracy Augustine MD        HYDROmorphone (DILAUDID) tablet 2 mg  2 mg Oral Q4H PRN Tracy Augustine MD        labetalol (NORMODYNE,TRANDATE) injection 10 mg  10 mg Intravenous Q30 Min PRN Tracy Augustine MD        lactated ringers infusion  150 mL/hr Intravenous Continuous Tracy Augustine  mL/hr at 10/03/24  1104 150 mL/hr at 10/03/24 1104    metoclopramide (REGLAN) tablet 10 mg  10 mg Oral Q6H PRN Tracy Augustine MD        Or    metoclopramide (REGLAN) injection 10 mg  10 mg Intravenous Q6H PRN Tracy Augustine MD        ondansetron ODT (ZOFRAN-ODT) disintegrating tablet 4 mg  4 mg Oral Q6H PRN Tracy Augustine MD        Or    ondansetron (ZOFRAN) injection 4 mg  4 mg Intravenous Q6H PRN Tracy Augustine MD        oxyCODONE (ROXICODONE) immediate release tablet 5 mg  5 mg Oral Q6H PRN Tracy Augustine MD        Pharmacy to Dose enoxaparin (LOVENOX)   Does not apply Continuous PRN Tracy Augustine MD        phenol (CHLORASEPTIC) 1.4 % liquid 1 spray  1 spray Mouth/Throat Q2H PRN Tracy Augustine MD        prochlorperazine (COMPAZINE) injection 10 mg  10 mg Intravenous Q6H PRN Tracy Augustine MD        Or    prochlorperazine (COMPAZINE) tablet 10 mg  10 mg Oral Q6H PRN Tracy Augustine MD        Or    prochlorperazine (COMPAZINE) suppository 25 mg  25 mg Rectal Q12H PRN Tracy Augustine MD        promethazine (PHENERGAN) tablet 12.5 mg  12.5 mg Oral Q6H PRN Tracy Augustine MD        Or    promethazine (PHENERGAN) suppository 12.5 mg  12.5 mg Rectal Q6H PRN Tracy Augustine MD        scopolamine patch 1 mg/72 hr  1 patch Transdermal Once Tracy Augustine MD   1 patch at 10/03/24 0643    scopolamine patch 1 mg/72 hr  1 patch Transdermal Once Tracy Augustine MD        simethicone (MYLICON) chewable tablet 80 mg  80 mg Oral 4x Daily PRN Tracy Augustine MD        [START ON 10/4/2024] sodium chloride 0.45 % with KCl 20 mEq/L infusion  100 mL/hr Intravenous Continuous Trayc Augustine MD        sodium chloride 0.9 % flush 1-10 mL  1-10 mL Intravenous PRN Tracy Augustine MD        sodium chloride 0.9 % flush 10 mL  10 mL Intravenous Q12H Tracy Augustine MD        sodium chloride 0.9 % infusion 40 mL  40 mL  Intravenous PRN Tracy Augustine MD        sodium chloride 0.9 % infusion  150 mL/hr Intravenous Continuous Tracy Augustine MD   Stopped at 10/03/24 1103    [START ON 10/4/2024] thiamine (B-1) 200 mg, folic acid 1 mg in sodium chloride 0.9 % 1,000 mL infusion  200 mL/hr Intravenous Once Tracy Augustine MD        thiamine (B-1) injection 100 mg  100 mg Intravenous Once Tracy Augustine MD         Lab Results (last 24 hours)       Procedure Component Value Units Date/Time    TISSUE EXAM, [facility] ([facility]) [[id number]] Collected: 10/03/24 0809    Specimen: Tissue from Stomach Updated: 10/03/24 1250          Imaging Results (Last 24 Hours)       ** No results found for the last 24 hours. **             Operative/Procedure Notes (last 24 hours)        Tracy Augustine MD at 10/03/24 0806          OPERATIVE REPORT    DATE: 10/03/24    PATIENT: Caity Hammond    PREOPERATIVE DIAGNOSIS:    1. Morbid obesity with comorbidities  2.  Hiatal hernia    POSTOPERATIVE DIAGNOSIS:    1. Morbid obesity with multiple comorbidities.  2.  Hiatal hernia    PROCEDURES PERFORMED:  1. Robotic assisted laparoscopic sleeve gastrectomy (85% subtotal vertical gastrectomy) and type I sliding  hiatal hernia repair    SURGEON:  Tracy Augustine M.D.      ANESTHESIA:  General endotracheal with TAP block    ESTIMATED BLOOD LOSS:   minimal    FLUIDS:  Crystalloids.    SPECIMENS:  Subtotal gastrectomy.    DRAINS:  None.    COUNTS:  Correct.    COMPLICATIONS:  None.    FINDINGS: normal gallbladder.  Small hiatal hernia.    INDICATIONS:   Caity Hammond is a 54 y.o. year old female with morbid obesity and associated comorbidities who presents for elective laparoscopic sleeve gastrectomy. The patient has undergone our extensive preoperative education, teaching, and consent process. Everything is in order and they wish to proceed.         DESCRIPTION OF PROCEDURE:   The patient was brought to the operating  room and placed supine upon the operating room table. SCDs were placed. The patient underwent uneventful general endotracheal anesthesia and bilateral TAP blocks per the anesthesiology staff.  She received subcutaneous Lovenox and was given Ancef. The abdomen was prepped with ChloraPrep and draped in the usual sterile fashion. An Ioban was used as well.  Timeout was performed.     A small transverse incision was made a few centimeters above and to the left of the umbilicus and the peritoneal cavity entered under direct camera visualization using a 5 mm 0° laparoscope and an OptiView trocar.  The abdomen was then insufflated to a pressure of 16 mmHg with CO2 gas. Exploratory laparoscopy revealed no evidence of injury from the entrance technique. The gallbladder was normal.  The liver had a uniform capsule and was normal in size without evidence of gross hepatomegaly or fibrosis.  Two 8 mm ports were placed to the patient's left, lateral of the first port, and a 12 mm port was placed to the patient's right.  The robot was docked, all safety checks were performed, and I moved to the robot console.     A 2-0 Stratafix suture was placed to make a liver sling with bites of the peritoneum and the right jonel of the diaphragm, and the left lobe of the liver was elevated.  A Standard bariatric bougie was inserted and used to decompress the stomach. The greater curvature vessels were taken down with the vessel sealer energy device beginning at the midpoint of the stomach and continued up to the angle of His, including the short gastrics. The left jonel was completely exposed and there was a hiatal hernia seen posteriorly. This was photodocumented. The GE junction fat pad was elevated to make a clear landing zone for the stapler later. The greater curvature vessels were taken down with the energy device extending distally within 3 cm of the pylorus. Filmy adhesions to the stomach were taken down posteriorly.     I dissected the  left jonel with the vessel sealer, then divided the pars flaccida.  There was no replaced hepatic vessel in the gastrohepatic ligament.  I dissected the right jonel, then passed a Penrose drain around the esophagus for retraction.  The phrenoesophageal ligament was divided.  The posterior vagus trunk was obvious and was carefully preserved.  Once the hiatus was completely dissected, I performed a posterior cruroplasty with running nonabsorbable 2-0 V-Loc suture.  The Penrose was removed.     The 36 [place] Standard Bariatric bougie was positioned along the lesser curvature of the stomach and its balloon was inflated.  The stomach was marked at 1 cm from the angle of His, 3 cm from the incisura, and 5 cm from the pylorus using an ink saturated Kittner.  1 mg of IV glucagon was given to relax gastric smooth muscle.  Using the bougie as a template, a vertical sleeve gastrectomy was fashioned with successive staple loads: the first load was blue, and the following 5 loads were white.   The staple line was examined and was hemostatic. The gastrectomy specimen was removed through the 12 mm port site. The specimen was later examined, and the staples were well-formed. Palpation of the specimen revealed no masses. The staple line of the sleeve gastrectomy revealed staples that were well-formed. The upper abdomen was flooded with saline, and a leak was performed by insufflating the stomach through the bougie. The leak test was normal.  The insufflated stomach was observed to be lying nicely without twist or stricture, and was appropriately sized.              I rescrubbed and suctioned the irrigation fluid from the upper abdomen, making sure it was dry. The staple line on the stomach was hemostatic.  The staple line was treated with 4 mL of aerosolized Tisseel fibrin glue. The liver stitch was removed easily. The 12 mm port was removed under direct visualization and there was no bleeding. This incision was closed with 0-Vicryl  transfascial suture using an M Close device. All other ports were removed and then replaced under direct visualization and all of these were hemostatic. The instruments were removed and the abdomen was desufflated. The ports were removed.  3-0 Monocryl plus was used to close skin incisions with interrupted sutures. Skin glue was placed. 10 mg of IV Barhemsys was given at the end of the case.  The patient was awakened and taken to recovery in good condition, having tolerated the procedure well. All sponge, needle, and instrument counts were correct.             Electronically signed by Tracy Augustine MD at 10/03/24 1003       Tracy Augustine MD at 10/03/24 0806          GASTRIC SLEEVE WITH HIATAL HERNIA LAPAROSCOPIC WITH DAVINCI ROBOT  Progress Note    Caity Hammond  10/3/2024    Pre-op Diagnosis:   Obesity, Class III, BMI 40-49.9 (morbid obesity) [E66.01]  Gastroesophageal reflux disease, unspecified whether esophagitis present [K21.9]       Post-Op Diagnosis Codes:     * Obesity, Class III, BMI 40-49.9 (morbid obesity) [E66.01]     * Gastroesophageal reflux disease, unspecified whether esophagitis present [K21.9]    Procedure/CPT® Codes:  AR LAPS GSTRC RSTRICTIV PX LONGITUDINAL GASTRECTOMY [76490]  AR LAPS SURG ESOPG/GSTR FUNDOPLASTY [55240]      Procedure(s):  GASTRIC SLEEVE WITH HIATAL HERNIA LAPAROSCOPIC WITH DAVINCI ROBOT              Surgeon(s):  Tracy Augustine MD    Anesthesia: General with Block    Staff:   Circulator: Kain Logan RN; Gill Ho RN; Trisha Hernandez RN  Scrub Person: Zohra Wright; Yamileth Heredia         Estimated Blood Loss: minimal    Urine Voided: * No values recorded between 10/3/2024  7:36 AM and 10/3/2024  9:51 AM *    Specimens:                Specimens       ID Source Type Tests Collected By Collected At Frozen?    A Stomach Tissue TISSUE EXAM, [facility] ([facility], [facility])   Gill Ho RN 10/3/24 0809 No    Description: SUB-TOTAL GASTRECTOMY    This  specimen was not marked as sent.                  Drains: * No LDAs found *    Findings: normal gallbladder.  Small hiatal hernia.        Complications: None immediate.         Tracy Augustine MD     Date: 10/3/2024  Time: 10:01 EDT          Electronically signed by Tracy Augustine MD at 10/03/24 1001       Physician Progress Notes (last 24 hours)  Notes from 10/02/24 1336 through 10/03/24 1336   No notes of this type exist for this encounter.

## 2024-10-03 NOTE — ANESTHESIA PROCEDURE NOTES
Airway  Urgency: elective    Date/Time: 10/3/2024 7:45 AM    General Information and Staff    Patient location during procedure: OR  CRNA/CAA: Jenna Plummer CRNA    Indications and Patient Condition  Indications for airway management: airway protection    Preoxygenated: yes  Mask difficulty assessment: 1 - vent by mask    Final Airway Details  Final airway type: endotracheal airway      Successful airway: ETT  Cuffed: yes   Successful intubation technique: direct laryngoscopy  Facilitating devices/methods: intubating stylet  Endotracheal tube insertion site: oral  Blade: Dion  Blade size: 3  ETT size (mm): 7.0  Cormack-Lehane Classification: grade I - full view of glottis  Placement verified by: chest auscultation and capnometry   Cuff volume (mL): 5  Measured from: lips  ETT/EBT  to lips (cm): 21  Number of attempts at approach: 1  Assessment: lips, teeth, and gum same as pre-op and atraumatic intubation    Additional Comments  +ETCO2, BBS,

## 2024-10-03 NOTE — BRIEF OP NOTE
GASTRIC SLEEVE WITH HIATAL HERNIA LAPAROSCOPIC WITH DAVINCI ROBOT  Progress Note    Caity Hammond  10/3/2024    Pre-op Diagnosis:   Obesity, Class III, BMI 40-49.9 (morbid obesity) [E66.01]  Gastroesophageal reflux disease, unspecified whether esophagitis present [K21.9]       Post-Op Diagnosis Codes:     * Obesity, Class III, BMI 40-49.9 (morbid obesity) [E66.01]     * Gastroesophageal reflux disease, unspecified whether esophagitis present [K21.9]    Procedure/CPT® Codes:  VA LAPS GSTRC RSTRICTIV PX LONGITUDINAL GASTRECTOMY [38110]  VA LAPS SURG ESOPG/GSTR FUNDOPLASTY [63682]      Procedure(s):  GASTRIC SLEEVE WITH HIATAL HERNIA LAPAROSCOPIC WITH DAVINCI ROBOT              Surgeon(s):  Tracy Augustine MD    Anesthesia: General with Block    Staff:   Circulator: Kain Logan, HARRISON; Gill oH, RN; Trisha Hernandez RN  Scrub Person: Zohra Wright; Yamileth Heredia         Estimated Blood Loss: minimal    Urine Voided: * No values recorded between 10/3/2024  7:36 AM and 10/3/2024  9:51 AM *    Specimens:                Specimens       ID Source Type Tests Collected By Collected At Frozen?    A Stomach Tissue TISSUE EXAM, [facility] ([facility])   Gill Ho, HARRISON 10/3/24 0809 No    Description: SUB-TOTAL GASTRECTOMY    This specimen was not marked as sent.                  Drains: * No LDAs found *    Findings: normal gallbladder.  Small hiatal hernia.        Complications: None immediate.         Tracy Augustine MD     Date: 10/3/2024  Time: 10:01 EDT

## 2024-10-03 NOTE — ANESTHESIA PREPROCEDURE EVALUATION
Anesthesia Evaluation     Patient summary reviewed and Nursing notes reviewed   history of anesthetic complications:  PONV  NPO Solid Status: > 8 hours             Airway   Mallampati: II  TM distance: >3 FB  Neck ROM: full  Possible difficult intubation and Difficult intubation highly probable  Dental - normal exam     Pulmonary - negative pulmonary ROS   (+) ,decreased breath sounds  (-) not a smoker  Cardiovascular - normal exam    ECG reviewed    (+) hyperlipidemia      Neuro/Psych- negative ROS  GI/Hepatic/Renal/Endo    (+) obesity, morbid obesity, GERD, PUD    Musculoskeletal (-) negative ROS    Abdominal   (+) obese   Substance History - negative use     OB/GYN          Other - negative ROS       ROS/Med Hx Other: RLS                Anesthesia Plan    ASA 2     general with block     (Risks and benefits discussed including risk of aspiration, recall and dental damage. All patient questions answered.    Will continue with plan of care.    Risks and benefits of peripheral nerve blocks for pain control explained to patient to include infection, bleeding at the site, paresthesia, damage to nerves, and incomplete analgesia.)  intravenous induction     Anesthetic plan, risks, benefits, and alternatives have been provided, discussed and informed consent has been obtained with: patient.  Pre-procedure education provided  Plan discussed with CRNA.    CODE STATUS:

## 2024-10-03 NOTE — ANESTHESIA PROCEDURE NOTES
Peripheral Block    Pre-sedation assessment completed: 10/3/2024 7:44 AM    Patient reassessed immediately prior to procedure    Patient location during procedure: OR  Start time: 10/3/2024 7:45 AM  Stop time: 10/3/2024 7:48 AM  Reason for block: at surgeon's request and post-op pain management  Performed by  CRNA/CAA: Miguel Gleason, CRNA  Preanesthetic Checklist  Completed: patient identified, IV checked, site marked, risks and benefits discussed, surgical consent, monitors and equipment checked, pre-op evaluation and timeout performed  Prep:  Pt Position: supine  Sterile barriers:gloves, cap, sterile barriers and mask  Prep: ChloraPrep  Patient monitoring: blood pressure monitoring, continuous pulse oximetry and EKG  Procedure    Sedation: yes  Performed under: general  Guidance:ultrasound guided    ULTRASOUND INTERPRETATION.  Using ultrasound guidance a 20 G gauge needle was placed in close proximity to the nerve, at which point, under ultrasound guidance anesthetic was injected in the area of the nerve and spread of the anesthesia was seen on ultrasound in close proximity thereto.  There were no abnormalities seen on ultrasound; a digital image was taken; and the patient tolerated the procedure with no complications. Images:still images obtained    Laterality:Bilateral  Block Type:TAP  Injection Technique:single-shot  Needle Type:echogenic  Needle Gauge:20 G  Resistance on Injection: none    Medications Used: bupivacaine PF (MARCAINE) injection 0.25% - Injection   30 mL - 10/3/2024 7:48:00 AM      Medications  Preservative Free Saline:30ml  Comment:Block Injection: LA dose divided between Right and Left Block  Adjuncts per total volume of LA:    Decadron 8 mg PSF      If required, intravenous sedation was given -- see meds on anesthesia record.    Post Assessment  Injection Assessment: negative aspiration for heme, no paresthesia on injection and incremental injection  Patient Tolerance:comfortable  throughout block  Complications:no  Additional Notes  Procedure:      BILATERAL TAP BLOCKS                             Patient analgesia was achieved with General Anesthesia    The pt was placed in the Supine Position and under Ultrasound guidance, an echogenic or touhy needle was advanced with Normal Saline hydro dissection of tissue.  The Internal Oblique and Transversus Abdominus muscles were visualized.  At or before the aponeurosis of Internal Oblique, the local anesthetic spread was visualized in the Transversus Abdominus Plane. Injection was made incrementally with aspiration every 5 mls.  There was no intravascular injection;  injection pressure was normal; there was no neural injection; and the procedure was completed without difficulty.    Performed by: Miguel Gleason, CRNA

## 2024-10-03 NOTE — PLAN OF CARE
Goal Outcome Evaluation:           Progress: improving              Problem: Adult Inpatient Plan of Care  Goal: Plan of Care Review  Outcome: Progressing  Flowsheets  Taken 10/3/2024 1843 by Ximena Bills RN  Progress: improving  Taken 10/3/2024 1110 by Iona Vasquez RN  Plan of Care Reviewed With: patient  Goal: Patient-Specific Goal (Individualized)  Outcome: Progressing  Goal: Absence of Hospital-Acquired Illness or Injury  Outcome: Progressing  Intervention: Identify and Manage Fall Risk  Description: Perform standard risk assessment on admission using a validated tool or comprehensive approach appropriate to the patient; reassess fall risk frequently, with change in status or transfer to another level of care.  Communicate fall injury risk to interprofessional healthcare team.  Determine need for increased observation, equipment and environmental modification, such as low bed, signage and supportive, nonskid footwear.  Adjust safety measures to individual developmental age, stage and identified risk factors.  Reinforce the importance of safety and physical activity with patient and family.  Perform regular intentional rounding to assess need for position change, pain assessment and personal needs, including assistance with toileting.  Recent Flowsheet Documentation  Taken 10/3/2024 1800 by Ximena Bills, RN  Safety Promotion/Fall Prevention:   safety round/check completed   room organization consistent   nonskid shoes/slippers when out of bed   fall prevention program maintained   clutter free environment maintained   assistive device/personal items within reach   activity supervised  Taken 10/3/2024 1600 by Ximena Bills, RN  Safety Promotion/Fall Prevention:   safety round/check completed   room organization consistent   nonskid shoes/slippers when out of bed   fall prevention program maintained   clutter free environment maintained   assistive device/personal items within reach    activity supervised  Taken 10/3/2024 1400 by Ximena Bills RN  Safety Promotion/Fall Prevention:   safety round/check completed   room organization consistent   nonskid shoes/slippers when out of bed   fall prevention program maintained   clutter free environment maintained   assistive device/personal items within reach   activity supervised  Intervention: Prevent Skin Injury  Description: Perform a screening for skin injury risk, such as pressure or moisture associated skin damage on admission and at regular intervals throughout hospital stay.  Keep all areas of skin (especially folds) clean and dry.  Maintain adequate skin hydration.  Relieve and redistribute pressure and protect bony prominences; implement measures based on patient-specific risk factors.  Match turning and repositioning schedule to clinical condition.  Encourage weight shift frequently; assist with reposition if unable to complete independently.  Float heels off bed; avoid pressure on the Achilles tendon.  Keep skin free from extended contact with medical devices.  Encourage functional activity and mobility, as early as tolerated.  Use aids (e.g., slide boards, mechanical lift) during transfer.  Recent Flowsheet Documentation  Taken 10/3/2024 1800 by Ximena Bills RN  Body Position: position changed independently  Taken 10/3/2024 1600 by Ximena Bills RN  Body Position: position changed independently  Taken 10/3/2024 1400 by Ximena Bills RN  Body Position: supine, legs elevated  Intervention: Prevent and Manage VTE (Venous Thromboembolism) Risk  Description: Assess for VTE (venous thromboembolism) risk.  Encourage and assist with early ambulation.  Initiate and maintain compression or other therapy, as indicated, based on identified risk in accordance with organizational protocol and provider order.  Encourage both active and passive leg exercises while in bed, if unable to ambulate.  Recent Flowsheet  Documentation  Taken 10/3/2024 1800 by Ximena Bills, RN  Activity Management: activity encouraged  Taken 10/3/2024 1600 by Ximena Bills RN  Activity Management: activity encouraged  Taken 10/3/2024 1400 by Ximena Bills RN  Activity Management: ambulated outside room  Taken 10/3/2024 1000 by Ximena Bills RN  Activity Management: ambulated in room  Goal: Optimal Comfort and Wellbeing  Outcome: Progressing  Intervention: Provide Person-Centered Care  Description: Use a family-focused approach to care.  Develop trust and rapport by proactively providing information, encouraging questions, addressing concerns and offering reassurance.  Acknowledge emotional response to hospitalization.  Recognize and utilize personal coping strategies.  Honor spiritual and cultural preferences.  Recent Flowsheet Documentation  Taken 10/3/2024 1400 by Ximena Bills RN  Trust Relationship/Rapport:   care explained   choices provided   questions answered   questions encouraged   thoughts/feelings acknowledged  Goal: Readiness for Transition of Care  Outcome: Progressing  Intervention: Mutually Develop Transition Plan  Description: Identify available resources for support (e.g., family, friends, community).  Identify and address barriers to ongoing treatment and home management (e.g., environmental, financial).  Provide opportunities to practice self-management skills.  Assess and monitor emotional readiness for transition.  Establish or reconnect linkage with outpatient providers or community-based services.  Recent Flowsheet Documentation  Taken 10/3/2024 1817 by Ximena Bills, RN  Equipment Currently Used at Home: none  Transportation Anticipated: family or friend will provide  Patient/Family Anticipated Services at Transition:   Patient/Family Anticipates Transition to: home with family     Problem: Post-Bariatric Surgery Care  Goal: Bariatric Home Regimen Maintained  Outcome:  Progressing     Problem: Bariatric Care Environmental Safety (Bariatric Surgery)  Goal: Safety Maintained with Care  Outcome: Progressing     Problem: Bleeding (Bariatric Surgery)  Goal: Absence of Bleeding  Outcome: Progressing     Problem: Bowel Motility Impaired (Bariatric Surgery)  Goal: Effective Bowel Motility and Elimination  Outcome: Progressing     Problem: Fluid and Electrolyte Imbalance (Bariatric Surgery)  Goal: Fluid and Electrolyte Balance  Outcome: Progressing     Problem: Glycemic Control Impaired (Bariatric Surgery)  Goal: Blood Glucose Level Within Desired Range  Outcome: Progressing     Problem: Infection (Bariatric Surgery)  Goal: Absence of Infection Signs and Symptoms  Outcome: Progressing  Intervention: Prevent or Manage Infection  Description: Optimize activity and mobility to maximize infection resistance (e.g., reposition, sit in chair, ambulate).  Optimize fluid balance, nutrition, sleep, oxygenation, glycemic control and body temperature to maximize resistance.  Maintain normothermia and glycemic control to maximize infection resistance.  Maintain dressing and closed drainage system integrity to reduce the risk for infection; inspect incision as visible.  Implement transmission-based precautions and isolation, as indicated, to prevent spread of infection.  Discontinue prophylactic antimicrobial agent within 24 hours after procedure, as directed.  Identify early signs of sepsis, such as increased heart rate and decreased blood pressure, as well as changes in mental state, respiratory pattern or peripheral perfusion.  Prepare for rapid sepsis management, including lactate level, intravenous access, fluid administration and oxygen therapy.  Provide fever-reduction and comfort measures.  Recent Flowsheet Documentation  Taken 10/3/2024 1800 by Ximena Bills RN  Infection Management: aseptic technique maintained  Taken 10/3/2024 1600 by Ximena Bills, RN  Infection Management:  aseptic technique maintained  Taken 10/3/2024 1400 by Ximena Bills, RN  Infection Management: aseptic technique maintained     Problem: Ongoing Anesthesia Effects (Bariatric Surgery)  Goal: Anesthesia/Sedation Recovery  Outcome: Progressing  Intervention: Optimize Anesthesia Recovery  Description: Assess and monitor airway, breathing and circulation; maintain close surveillance for deterioration.  Implement continuous monitoring, such as cardiorespiratory, blood pressure, temperature, pulse oximetry and capnography.  Elevate head of bed, if able; facilitate regular position changes.  Assess neurocognitive function and for risks that may lead to postoperative delirium, such as decreased level of consciousness, pain and agitation; offer reassurance; answer questions.  Assess and monitor neurovascular and neuromuscular function, such as motor strength, tone, posture, peripheral pulses and extremity sensation; protect areas of decreased sensation from heat, cold, medical devices or objects.  Individualize frequency and intensity of monitoring based on sedation or anesthesia administered, identified risk factors, ongoing assessment and organizational protocol.  Prepare for administration of pharmacologic therapy, such as reversal agent, antiemetic or antipruritic medication, to manage sedation or anesthesia effects.  Adjust environment to maintain safety (e.g., fall precautions, safety equipment).  Recent Flowsheet Documentation  Taken 10/3/2024 1800 by Ximena Bills, RN  Safety Promotion/Fall Prevention:   safety round/check completed   room organization consistent   nonskid shoes/slippers when out of bed   fall prevention program maintained   clutter free environment maintained   assistive device/personal items within reach   activity supervised  Taken 10/3/2024 1600 by Ximena Bills, RN  Safety Promotion/Fall Prevention:   safety round/check completed   room organization consistent   nonskid  shoes/slippers when out of bed   fall prevention program maintained   clutter free environment maintained   assistive device/personal items within reach   activity supervised  Administration (IS): instruction provided, follow-up  Taken 10/3/2024 1500 by Ximena Bills RN  Administration (IS): instruction provided, follow-up  Taken 10/3/2024 1400 by Ximena Bills RN  Safety Promotion/Fall Prevention:   safety round/check completed   room organization consistent   nonskid shoes/slippers when out of bed   fall prevention program maintained   clutter free environment maintained   assistive device/personal items within reach   activity supervised  Administration (IS): instruction provided, follow-up  Taken 10/3/2024 1100 by Ximena Bills RN  Administration (IS): self-administered  Taken 10/3/2024 1000 by Ximena Bills RN  Patient Tolerance (IS): good  Level Incentive Spirometer (mL): 2500  Number of Repetitions (IS): 10     Problem: Pain (Bariatric Surgery)  Goal: Acceptable Pain Control  Outcome: Progressing  Intervention: Prevent or Manage Pain  Description: Develop mutual pain management plan with patient and caregiver/family; review regularly.  Evaluate risk for opioid use; individualize pharmacologic pain management plan and titrate medication to patient response.  Combine multimodal analgesia and nonpharmacologic strategies to help potentiate synergistic effects and enhance comfort (e.g., complementary therapy, diversional activity).  Provide around-the-clock dosing of pain medication to keep pain levels in control.  Manage medication-induced effects, such as constipation, nausea, vomiting.  Manage medication-induced effects, such as respiratory depression, constipation, nausea, vomiting.  Minimize pain stimuli; coordinate care and adjust environment (e.g., light, noise, unnecessary movement); promote sleep/rest for optimal healing.  Recent Flowsheet Documentation  Taken 10/3/2024 1400 by  Ximena Bills RN  Diversional Activities: television     Problem: Postoperative Nausea and Vomiting (Bariatric Surgery)  Goal: Nausea and Vomiting Relief  Outcome: Progressing  Intervention: Prevent or Manage Nausea and Vomiting  Description: Promote aspiration prophylaxis (e.g., positioning).  Minimize use of opioid medication; consider alternatives.  Consider complementary therapy (e.g., acupuncture, point P6 stimulation, aromatherapy, controlled breathing).  Maintain fluid and electrolyte balance; encourage oral fluids when able.  Promote early feeding.  Anticipate need for prophylactic antiemetic agents in patients with risk factors; consider multimodal treatment.  Recent Flowsheet Documentation  Taken 10/3/2024 1436 by Ximena Bills RN  Nausea/Vomiting Interventions: see MAR     Problem: Postoperative Urinary Retention (Bariatric Surgery)  Goal: Effective Urinary Elimination  Outcome: Progressing     Problem: Respiratory Compromise (Bariatric Surgery)  Goal: Effective Oxygenation and Ventilation  Outcome: Progressing  Intervention: Optimize Oxygenation and Ventilation  Description: Recognize risk for obstructive sleep apnea; anticipate the need for continuous pulse oximetry and noninvasive positive pressure ventilation.  Maintain patent airway with positioning, airway adjuncts, secretion clearance.  Encourage pulmonary hygiene, such as cough-enhancement and airway-clearance techniques, that may include use of incentive spirometry, deep breathing and cough.  Anticipate the need for splinting with cough to minimize discomfort; assist if needed.  Provide oxygen therapy judiciously, if hypoxemia present.  Consider pharmacologic therapy that may improve mucus clearance and reduce bronchospasm, laryngospasm, swelling or stridor.  Consider the need for intubation and invasive positive pressure ventilation for longer recovery needs; use lung protective measures.  Recent Flowsheet Documentation  Taken  10/3/2024 1400 by Ximena Bills, RN  Head of Bed (HOB) Positioning: HOB at 45 degrees

## 2024-10-03 NOTE — OP NOTE
OPERATIVE REPORT    DATE: 10/03/24    PATIENT: Caity Hammond    PREOPERATIVE DIAGNOSIS:    1. Morbid obesity with comorbidities  2.  Hiatal hernia    POSTOPERATIVE DIAGNOSIS:    1. Morbid obesity with multiple comorbidities.  2.  Hiatal hernia    PROCEDURES PERFORMED:  1. Robotic assisted laparoscopic sleeve gastrectomy (85% subtotal vertical gastrectomy) and type I sliding  hiatal hernia repair    SURGEON:  Tracy Augustine M.D.      ANESTHESIA:  General endotracheal with TAP block    ESTIMATED BLOOD LOSS:   minimal    FLUIDS:  Crystalloids.    SPECIMENS:  Subtotal gastrectomy.    DRAINS:  None.    COUNTS:  Correct.    COMPLICATIONS:  None.    FINDINGS: normal gallbladder.  Small hiatal hernia.    INDICATIONS:   Caity Hammond is a 54 y.o. year old female with morbid obesity and associated comorbidities who presents for elective laparoscopic sleeve gastrectomy. The patient has undergone our extensive preoperative education, teaching, and consent process. Everything is in order and they wish to proceed.         DESCRIPTION OF PROCEDURE:   The patient was brought to the operating room and placed supine upon the operating room table. SCDs were placed. The patient underwent uneventful general endotracheal anesthesia and bilateral TAP blocks per the anesthesiology staff.  She received subcutaneous Lovenox and was given Ancef. The abdomen was prepped with ChloraPrep and draped in the usual sterile fashion. An Ioban was used as well.  Timeout was performed.     A small transverse incision was made a few centimeters above and to the left of the umbilicus and the peritoneal cavity entered under direct camera visualization using a 5 mm 0° laparoscope and an OptiView trocar.  The abdomen was then insufflated to a pressure of 16 mmHg with CO2 gas. Exploratory laparoscopy revealed no evidence of injury from the entrance technique. The gallbladder was normal.  The liver had a uniform capsule and was normal in size  without evidence of gross hepatomegaly or fibrosis.  Two 8 mm ports were placed to the patient's left, lateral of the first port, and a 12 mm port was placed to the patient's right.  The robot was docked, all safety checks were performed, and I moved to the robot console.     A 2-0 Stratafix suture was placed to make a liver sling with bites of the peritoneum and the right jonel of the diaphragm, and the left lobe of the liver was elevated.  A Standard bariatric bougie was inserted and used to decompress the stomach. The greater curvature vessels were taken down with the vessel sealer energy device beginning at the midpoint of the stomach and continued up to the angle of His, including the short gastrics. The left jonel was completely exposed and there was a hiatal hernia seen posteriorly. This was photodocumented. The GE junction fat pad was elevated to make a clear landing zone for the stapler later. The greater curvature vessels were taken down with the energy device extending distally within 3 cm of the pylorus. Filmy adhesions to the stomach were taken down posteriorly.     I dissected the left jonel with the vessel sealer, then divided the pars flaccida.  There was no replaced hepatic vessel in the gastrohepatic ligament.  I dissected the right jonel, then passed a Penrose drain around the esophagus for retraction.  The phrenoesophageal ligament was divided.  The posterior vagus trunk was obvious and was carefully preserved.  Once the hiatus was completely dissected, I performed a posterior cruroplasty with running nonabsorbable 2-0 V-Loc suture.  The Penrose was removed.     The 36 Urdu Standard Bariatric bougie was positioned along the lesser curvature of the stomach and its balloon was inflated.  The stomach was marked at 1 cm from the angle of His, 3 cm from the incisura, and 5 cm from the pylorus using an ink saturated Kittner.  1 mg of IV glucagon was given to relax gastric smooth muscle.  Using the bougie  as a template, a vertical sleeve gastrectomy was fashioned with successive staple loads: the first load was blue, and the following 5 loads were white.   The staple line was examined and was hemostatic. The gastrectomy specimen was removed through the 12 mm port site. The specimen was later examined, and the staples were well-formed. Palpation of the specimen revealed no masses. The staple line of the sleeve gastrectomy revealed staples that were well-formed. The upper abdomen was flooded with saline, and a leak was performed by insufflating the stomach through the bougie. The leak test was normal.  The insufflated stomach was observed to be lying nicely without twist or stricture, and was appropriately sized.              I rescrubbed and suctioned the irrigation fluid from the upper abdomen, making sure it was dry. The staple line on the stomach was hemostatic.  The staple line was treated with 4 mL of aerosolized Tisseel fibrin glue. The liver stitch was removed easily. The 12 mm port was removed under direct visualization and there was no bleeding. This incision was closed with 0-Vicryl transfascial suture using an M Close device. All other ports were removed and then replaced under direct visualization and all of these were hemostatic. The instruments were removed and the abdomen was desufflated. The ports were removed.  3-0 Monocryl plus was used to close skin incisions with interrupted sutures. Skin glue was placed. 10 mg of IV Barhemsys was given at the end of the case.  The patient was awakened and taken to recovery in good condition, having tolerated the procedure well. All sponge, needle, and instrument counts were correct.

## 2024-10-04 ENCOUNTER — READMISSION MANAGEMENT (OUTPATIENT)
Dept: CALL CENTER | Facility: HOSPITAL | Age: 54
End: 2024-10-04
Payer: COMMERCIAL

## 2024-10-04 ENCOUNTER — APPOINTMENT (OUTPATIENT)
Dept: GENERAL RADIOLOGY | Facility: HOSPITAL | Age: 54
DRG: 621 | End: 2024-10-04
Payer: COMMERCIAL

## 2024-10-04 VITALS
HEIGHT: 69 IN | DIASTOLIC BLOOD PRESSURE: 72 MMHG | WEIGHT: 293 LBS | TEMPERATURE: 98.4 F | SYSTOLIC BLOOD PRESSURE: 154 MMHG | HEART RATE: 83 BPM | BODY MASS INDEX: 43.4 KG/M2 | OXYGEN SATURATION: 94 % | RESPIRATION RATE: 16 BRPM

## 2024-10-04 LAB
ALBUMIN SERPL-MCNC: 3.7 G/DL (ref 3.5–5.2)
ALBUMIN/GLOB SERPL: 1.6 G/DL
ALP SERPL-CCNC: 56 U/L (ref 39–117)
ALT SERPL W P-5'-P-CCNC: 20 U/L (ref 1–33)
ANION GAP SERPL CALCULATED.3IONS-SCNC: 10.6 MMOL/L (ref 5–15)
AST SERPL-CCNC: 22 U/L (ref 1–32)
BASOPHILS # BLD AUTO: 0.01 10*3/MM3 (ref 0–0.2)
BASOPHILS NFR BLD AUTO: 0.1 % (ref 0–1.5)
BILIRUB SERPL-MCNC: 0.3 MG/DL (ref 0–1.2)
BUN SERPL-MCNC: 9 MG/DL (ref 6–20)
BUN/CREAT SERPL: 15.3 (ref 7–25)
CALCIUM SPEC-SCNC: 8.6 MG/DL (ref 8.6–10.5)
CHLORIDE SERPL-SCNC: 106 MMOL/L (ref 98–107)
CO2 SERPL-SCNC: 23.4 MMOL/L (ref 22–29)
CREAT SERPL-MCNC: 0.59 MG/DL (ref 0.57–1)
DEPRECATED RDW RBC AUTO: 45.3 FL (ref 37–54)
EGFRCR SERPLBLD CKD-EPI 2021: 107.9 ML/MIN/1.73
EOSINOPHIL # BLD AUTO: 0 10*3/MM3 (ref 0–0.4)
EOSINOPHIL NFR BLD AUTO: 0 % (ref 0.3–6.2)
ERYTHROCYTE [DISTWIDTH] IN BLOOD BY AUTOMATED COUNT: 14.8 % (ref 12.3–15.4)
GLOBULIN UR ELPH-MCNC: 2.3 GM/DL
GLUCOSE SERPL-MCNC: 114 MG/DL (ref 65–99)
HCT VFR BLD AUTO: 39.2 % (ref 34–46.6)
HGB BLD-MCNC: 12.1 G/DL (ref 12–15.9)
IMM GRANULOCYTES # BLD AUTO: 0.03 10*3/MM3 (ref 0–0.05)
IMM GRANULOCYTES NFR BLD AUTO: 0.4 % (ref 0–0.5)
IRON 24H UR-MRATE: 27 MCG/DL (ref 37–145)
LYMPHOCYTES # BLD AUTO: 1.2 10*3/MM3 (ref 0.7–3.1)
LYMPHOCYTES NFR BLD AUTO: 15.1 % (ref 19.6–45.3)
MCH RBC QN AUTO: 26.1 PG (ref 26.6–33)
MCHC RBC AUTO-ENTMCNC: 30.9 G/DL (ref 31.5–35.7)
MCV RBC AUTO: 84.7 FL (ref 79–97)
MONOCYTES # BLD AUTO: 0.7 10*3/MM3 (ref 0.1–0.9)
MONOCYTES NFR BLD AUTO: 8.8 % (ref 5–12)
NEUTROPHILS NFR BLD AUTO: 5.99 10*3/MM3 (ref 1.7–7)
NEUTROPHILS NFR BLD AUTO: 75.6 % (ref 42.7–76)
NRBC BLD AUTO-RTO: 0 /100 WBC (ref 0–0.2)
PLATELET # BLD AUTO: 234 10*3/MM3 (ref 140–450)
PMV BLD AUTO: 10.6 FL (ref 6–12)
POTASSIUM SERPL-SCNC: 4.5 MMOL/L (ref 3.5–5.2)
PROT SERPL-MCNC: 6 G/DL (ref 6–8.5)
RBC # BLD AUTO: 4.63 10*6/MM3 (ref 3.77–5.28)
SODIUM SERPL-SCNC: 140 MMOL/L (ref 136–145)
WBC NRBC COR # BLD AUTO: 7.93 10*3/MM3 (ref 3.4–10.8)

## 2024-10-04 PROCEDURE — 0 DIATRIZOATE MEGLUMINE & SODIUM PER 1 ML: Performed by: SURGERY

## 2024-10-04 PROCEDURE — 25810000003 SODIUM CHLORIDE 0.9 % SOLUTION 1,000 ML FLEX CONT: Performed by: SURGERY

## 2024-10-04 PROCEDURE — 25010000002 CYANOCOBALAMIN PER 1000 MCG: Performed by: SURGERY

## 2024-10-04 PROCEDURE — 25010000002 ENOXAPARIN PER 10 MG

## 2024-10-04 PROCEDURE — 25010000002 THIAMINE PER 100 MG: Performed by: SURGERY

## 2024-10-04 PROCEDURE — 85025 COMPLETE CBC W/AUTO DIFF WBC: CPT | Performed by: SURGERY

## 2024-10-04 PROCEDURE — 99024 POSTOP FOLLOW-UP VISIT: CPT | Performed by: SURGERY

## 2024-10-04 PROCEDURE — 83540 ASSAY OF IRON: CPT | Performed by: SURGERY

## 2024-10-04 PROCEDURE — 74240 X-RAY XM UPR GI TRC 1CNTRST: CPT

## 2024-10-04 PROCEDURE — 25010000002 NA FERRIC GLUC CPLX PER 12.5 MG: Performed by: SURGERY

## 2024-10-04 PROCEDURE — 25010000002 POTASSIUM CHLORIDE PER 2 MEQ: Performed by: SURGERY

## 2024-10-04 PROCEDURE — 80053 COMPREHEN METABOLIC PANEL: CPT | Performed by: SURGERY

## 2024-10-04 RX ORDER — OMEPRAZOLE 40 MG/1
40 CAPSULE, DELAYED RELEASE ORAL DAILY
Qty: 60 CAPSULE | Refills: 0 | Status: SHIPPED | OUTPATIENT
Start: 2024-10-04 | End: 2024-12-03

## 2024-10-04 RX ORDER — DIATRIZOATE MEGLUMINE AND DIATRIZOATE SODIUM 660; 100 MG/ML; MG/ML
90 SOLUTION ORAL; RECTAL ONCE
Status: COMPLETED | OUTPATIENT
Start: 2024-10-04 | End: 2024-10-04

## 2024-10-04 RX ORDER — OMEPRAZOLE 40 MG/1
40 CAPSULE, DELAYED RELEASE ORAL DAILY
Qty: 60 CAPSULE | Refills: 0 | Status: SHIPPED | OUTPATIENT
Start: 2024-10-04 | End: 2024-10-04 | Stop reason: HOSPADM

## 2024-10-04 RX ADMIN — SODIUM CHLORIDE 125 MG: 900 INJECTION, SOLUTION INTRAVENOUS at 09:32

## 2024-10-04 RX ADMIN — ACETAMINOPHEN 1000 MG: 500 TABLET, FILM COATED ORAL at 13:17

## 2024-10-04 RX ADMIN — ACETAMINOPHEN 1000 MG: 500 TABLET, FILM COATED ORAL at 06:14

## 2024-10-04 RX ADMIN — Medication 10 ML: at 08:35

## 2024-10-04 RX ADMIN — POTASSIUM CHLORIDE AND SODIUM CHLORIDE 100 ML/HR: 450; 150 INJECTION, SOLUTION INTRAVENOUS at 09:32

## 2024-10-04 RX ADMIN — CETIRIZINE HYDROCHLORIDE 10 MG: 10 TABLET, FILM COATED ORAL at 08:34

## 2024-10-04 RX ADMIN — THIAMINE HYDROCHLORIDE 200 ML/HR: 200 INJECTION, SOLUTION INTRAMUSCULAR; INTRAVENOUS at 03:51

## 2024-10-04 RX ADMIN — CYANOCOBALAMIN 1000 MCG: 1000 INJECTION, SOLUTION INTRAMUSCULAR at 08:34

## 2024-10-04 RX ADMIN — GABAPENTIN 100 MG: 100 CAPSULE ORAL at 16:26

## 2024-10-04 RX ADMIN — ENOXAPARIN SODIUM 40 MG: 100 INJECTION SUBCUTANEOUS at 08:35

## 2024-10-04 RX ADMIN — DIATRIZOATE MEGLUMINE AND DIATRIZOATE SODIUM 90 ML: 660; 100 LIQUID ORAL; RECTAL at 08:42

## 2024-10-04 RX ADMIN — GABAPENTIN 100 MG: 100 CAPSULE ORAL at 08:34

## 2024-10-04 NOTE — PROGRESS NOTES
"Patient: Caity Hammond  Procedure(s):  GASTRIC SLEEVE WITH HIATAL HERNIA LAPAROSCOPIC WITH DAVINCI ROBOT  Anesthesia type: General with Block    Patient location: [facility] Surgical Floor  Last vitals: /71 (BP Location: Left arm, Patient Position: Lying)   Pulse 83   Temp 98.5 °F (36.9 °C) (Oral)   Resp 16   Ht 174 cm (68.5\")   Wt (!) 143 kg (315 lb 4.1 oz)   SpO2 94%   BMI 47.23 kg/m²   Level of consciousness: awake, alert, and oriented    Post-anesthesia pain: adequate analgesia  Airway patency: patent  Respiratory: unassisted  Cardiovascular: stable and blood pressure at baseline  Hydration: euvolemic    Anesthetic complications: no  "

## 2024-10-04 NOTE — PROGRESS NOTES
"Bariatric Surgery     LOS: 1 day   Patient Care Team:  Radha Madden APRN as PCP - General (Family Medicine)    Chief Complaint:  post op    Subjective     Interval History:  Doing well.  No complaints.  Tolerating PO--2 protein shakes so far. Denies N/V.  No fevers.  Pain controlled.  Ambulating.  Voiding.  IS 3000.    Objective     Vital Signs  Blood pressure 154/72, pulse 83, temperature 98.4 °F (36.9 °C), temperature source Oral, resp. rate 16, height 174 cm (68.5\"), weight (!) 143 kg (315 lb 4.1 oz), SpO2 94%.    Physical Exam:  General: Alert, NAD  Abdomen: incisions okay  Extremities: (+) SCDs     Results Review:     I reviewed the patient's new clinical results.  I reviewed the patient's new imaging results and agree with the interpretation.    Labs:  Lab Results (last 24 hours)       Procedure Component Value Units Date/Time    CBC & Differential [[id number]]  (Abnormal) Collected: 10/04/24 0407    Specimen: Blood Updated: 10/04/24 0450    Narrative:      The following orders were created for panel order CBC & Differential.  Procedure                               Abnormality         Status                     ---------                               -----------         ------                     CBC Auto Differential[[id number]]        Abnormal            Final result                 Please view results for these tests on the individual orders.    CBC Auto Differential [[id number]]  (Abnormal) Collected: 10/04/24 0407    Specimen: Blood Updated: 10/04/24 0450     WBC 7.93 10*3/mm3      RBC 4.63 10*6/mm3      Hemoglobin 12.1 g/dL      Hematocrit 39.2 %      MCV 84.7 fL      MCH 26.1 pg      MCHC 30.9 g/dL      RDW 14.8 %      RDW-SD 45.3 fl      MPV 10.6 fL      Platelets 234 10*3/mm3      Neutrophil % 75.6 %      Lymphocyte % 15.1 %      Monocyte % 8.8 %      Eosinophil % 0.0 %      Basophil % 0.1 %      Immature Grans % 0.4 %      Neutrophils, Absolute 5.99 10*3/mm3      Lymphocytes, Absolute 1.20 " 10*3/mm3      Monocytes, Absolute 0.70 10*3/mm3      Eosinophils, Absolute 0.00 10*3/mm3      Basophils, Absolute 0.01 10*3/mm3      Immature Grans, Absolute 0.03 10*3/mm3      nRBC 0.0 /100 WBC     Iron [[id number]]  (Abnormal) Collected: 10/04/24 0407    Specimen: Blood Updated: 10/04/24 0437     Iron 27 mcg/dL     Comprehensive Metabolic Panel [[id number]]  (Abnormal) Collected: 10/04/24 0407    Specimen: Blood Updated: 10/04/24 0437     Glucose 114 mg/dL      BUN 9 mg/dL      Creatinine 0.59 mg/dL      Sodium 140 mmol/L      Potassium 4.5 mmol/L      Chloride 106 mmol/L      CO2 23.4 mmol/L      Calcium 8.6 mg/dL      Total Protein 6.0 g/dL      Albumin 3.7 g/dL      ALT (SGPT) 20 U/L      AST (SGOT) 22 U/L      Alkaline Phosphatase 56 U/L      Total Bilirubin 0.3 mg/dL      Globulin 2.3 gm/dL      A/G Ratio 1.6 g/dL      BUN/Creatinine Ratio 15.3     Anion Gap 10.6 mmol/L      eGFR 107.9 mL/min/1.73     Narrative:      GFR Normal >60  Chronic Kidney Disease <60  Kidney Failure <15              Imaging:  Imaging Results (Last 24 Hours)       Procedure Component Value Units Date/Time    FL Upper GI Water Soluble [[id number]] Collected: 10/04/24 1545     Updated: 10/04/24 1548    Narrative:      PROCEDURE: FL UPPER GI WATER SOLUBLE-        UPPER GI     HISTORY: post sleeve, rule out leak; E66.01-Morbid (severe) obesity due  to excess calories; K21.6-Ggtudy-wtuhmxgwhb reflux disease without  esophagitis     TECHNIQUE: Gastrografin contrast was administered.     Findings: Limited small-volume assessment. The esophagus appears normal.  No visualized gastroesophageal reflux. No appreciated hiatal hernia.  Postoperative changes of gastric sleeve. There is prompt passage of  contrast through the esophagus into the stomach and into the duodenum.  No evidence of obstruction. No extravasation of contrast to suggest  leak.       Impression:      Post gastric sleeve without evidence of leak or obstruction.           DOSE AREA  PRODUCT: 8.609 Gycm2  FLUORO TIME: 23 s                    Images were reviewed, interpreted, and dictated by Dr. Gale Pérez MD  Transcribed by Placido Quiros PA-C.     This report was signed and finalized on 10/4/2024 3:46 PM by Gale Pérez MD.                 Assessment & Plan     POD # 1 s/p robotic sleeve and hiatal hernia repair.    Doing well.  UGI normal post-op, images and report reviewed.  IV iron given for low Fe without evidence of bleeding on Lovenox.      Patient feels well and has met discharge criteria.  Will discharge home with follow up in 1 week with PA.  Rx given for PPI.   Declined narcotic  and nausea rx.  Discharge instructions reviewed with patient and all questions answered.          The patient presents today for telehealth service.  The service was conducted via HIPAA compliant Epic video platform.    The provider is located at her work address.  The patient is located at in the [facility] in [place] and stated that they are in a secure environment for the session.  The patient's condition being diagnosed/treated is appropriate for telemedicine.       The use of a video visit has been reviewed with the patient and verbal informed consent has been obtained.           Tracy Augustine MD  10/04/24  16:59 EDT

## 2024-10-04 NOTE — PLAN OF CARE
Goal Outcome Evaluation:  Plan of Care Reviewed With: patient        Progress: improving  Outcome Evaluation: VSS; pt ambulated multiple times in hallway without difficulty; pain controlled with scheduled tylenol; no issues noted overnight

## 2024-10-04 NOTE — PLAN OF CARE
Problem: Adult Inpatient Plan of Care  Goal: Plan of Care Review  Outcome: Progressing  Flowsheets  Taken 10/4/2024 0901 by Debbi Matthews RN  Progress: improving  Taken 10/4/2024 0522 by Shazia Loomis RN  Plan of Care Reviewed With: patient  Goal: Patient-Specific Goal (Individualized)  Outcome: Progressing  Goal: Absence of Hospital-Acquired Illness or Injury  Outcome: Progressing  Intervention: Identify and Manage Fall Risk  Recent Flowsheet Documentation  Taken 10/4/2024 0800 by Debbi Matthews RN  Safety Promotion/Fall Prevention: safety round/check completed  Intervention: Prevent Skin Injury  Recent Flowsheet Documentation  Taken 10/4/2024 0800 by Debbi Matthews RN  Body Position: position changed independently  Intervention: Prevent and Manage VTE (Venous Thromboembolism) Risk  Recent Flowsheet Documentation  Taken 10/4/2024 0800 by Debbi Matthews RN  Activity Management: up ad ronal  Goal: Optimal Comfort and Wellbeing  Outcome: Progressing  Goal: Readiness for Transition of Care  Outcome: Progressing     Problem: Post-Bariatric Surgery Care  Goal: Bariatric Home Regimen Maintained  Outcome: Progressing     Problem: Bariatric Care Environmental Safety (Bariatric Surgery)  Goal: Safety Maintained with Care  Outcome: Progressing     Problem: Bleeding (Bariatric Surgery)  Goal: Absence of Bleeding  Outcome: Progressing     Problem: Bowel Motility Impaired (Bariatric Surgery)  Goal: Effective Bowel Motility and Elimination  Outcome: Progressing     Problem: Fluid and Electrolyte Imbalance (Bariatric Surgery)  Goal: Fluid and Electrolyte Balance  Outcome: Progressing     Problem: Glycemic Control Impaired (Bariatric Surgery)  Goal: Blood Glucose Level Within Desired Range  Outcome: Progressing     Problem: Infection (Bariatric Surgery)  Goal: Absence of Infection Signs and Symptoms  Outcome: Progressing     Problem: Ongoing Anesthesia Effects (Bariatric Surgery)  Goal: Anesthesia/Sedation Recovery  Outcome:  Progressing  Intervention: Optimize Anesthesia Recovery  Recent Flowsheet Documentation  Taken 10/4/2024 0800 by Debbi Matthews RN  Safety Promotion/Fall Prevention: safety round/check completed     Problem: Pain (Bariatric Surgery)  Goal: Acceptable Pain Control  Outcome: Progressing     Problem: Postoperative Nausea and Vomiting (Bariatric Surgery)  Goal: Nausea and Vomiting Relief  Outcome: Progressing     Problem: Postoperative Urinary Retention (Bariatric Surgery)  Goal: Effective Urinary Elimination  Outcome: Progressing     Problem: Respiratory Compromise (Bariatric Surgery)  Goal: Effective Oxygenation and Ventilation  Outcome: Progressing  Intervention: Optimize Oxygenation and Ventilation  Recent Flowsheet Documentation  Taken 10/4/2024 0800 by Debbi Matthews RN  Head of Bed (HOB) Positioning: HOB elevated   Goal Outcome Evaluation:           Progress: improving

## 2024-10-04 NOTE — OUTREACH NOTE
Prep Survey      Flowsheet Row Responses   [facility] patient discharged from? [facility]   Is LACE score < 7 ? Yes   Eligibility [facility]   Date of Admission 10/03/24   Date of Discharge 10/04/24   Discharge Disposition Home or Self Care   Discharge diagnosis GASTRIC SLEEVE WITH HIATAL HERNIA LAPAROSCOPIC WITH DAVINCI ROBOT   Does the patient have one of the following disease processes/diagnoses(primary or secondary)? General Surgery   Does the patient have Home health ordered? No   Is there a DME ordered? No   Prep survey completed? Yes            Connie FERNANDEZ - Registered Nurse

## 2024-10-04 NOTE — CASE MANAGEMENT/SOCIAL WORK
Discharge Planning Assessment  [facility]     Patient Name: Caity Hammond  MRN: [id number]  Today's Date: 10/4/2024    Admit Date: 10/3/2024    Plan: DCP is  to return home with . Spoke with pt and daughter at bedside. Permission given to discuss DCP with  [facility]. Pt confirmed demographics. States no to Living Will/POA. PCP; Radha MARTINEZ, pharmacy is [facility] in [place]. Agreed to meds to bed. Does not use DME and has no new DME needs anticipated at this time. Pt works full time and is independent with ADL's and mobility. Pt denied financial strain or food insecurity at this time. Family to transport home when medically stable for discharge.   Discharge Needs Assessment    No documentation.                  Discharge Plan    No documentation.                 Continued Care and Services - Admitted Since 10/3/2024    No active coordination exists for this encounter.       Expected Discharge Date and Time       Expected Discharge Date Expected Discharge Time    Oct 4, 2024            Demographic Summary    No documentation.                  Functional Status    No documentation.                  Psychosocial    No documentation.                  Abuse/Neglect    No documentation.                  Legal    No documentation.                  Substance Abuse    No documentation.                  Patient Forms    No documentation.                     Luci Sena RN

## 2024-10-07 ENCOUNTER — TRANSITIONAL CARE MANAGEMENT TELEPHONE ENCOUNTER (OUTPATIENT)
Dept: CALL CENTER | Facility: HOSPITAL | Age: 54
End: 2024-10-07
Payer: COMMERCIAL

## 2024-10-07 LAB — REF LAB TEST METHOD: NORMAL

## 2024-10-07 NOTE — OUTREACH NOTE
Call Center TCM Note      Flowsheet Row Responses   [facility] patient discharged from? [facility]   Does the patient have one of the following disease processes/diagnoses(primary or secondary)? General Surgery   TCM attempt successful? Yes   Call start time 1204   Call end time 1213   Discharge diagnosis GASTRIC SLEEVE WITH HIATAL HERNIA LAPAROSCOPIC WITH DAVINCI ROBOT   Is patient permission given to speak with other caregiver? No   Person spoke with today (if not patient) and relationship Patient   Meds reviewed with patient/caregiver? Yes   Is the patient having any side effects they believe may be caused by any medication additions or changes? No   Does the patient have all medications related to this admission filled (includes all antibiotics, pain medications, etc.) Yes   Is the patient taking all medications as directed (includes completed medication regime)? Yes   Does the patient have an appointment with their PCP within 7-14 days of discharge? No   Nursing Interventions Patient declined scheduling/rescheduling appointment at this time, Patient desires to follow up with specialty only   Has home health visited the patient within 72 hours of discharge? N/A   Psychosocial issues? No   Did the patient receive a copy of their discharge instructions? Yes   Nursing interventions Reviewed instructions with patient   What is the patient's perception of their health status since discharge? Improving   Is the patient /caregiver able to teach back basic post-op care? Continue use of incentive spirometry at least 1 week post discharge, Practice 'cough and deep breath', Drive as instructed by MD in discharge instructions, Take showers only when approved by MD-sponge bathe until then, No tub bath, swimming, or hot tub until instructed by MD, Keep incision areas clean,dry and protected, Lifting as instructed by MD in discharge instructions, Do not remove steri-strips   Is the patient/caregiver able to teach back  signs and symptoms of incisional infection? Increased redness, swelling or pain at the incisonal site, Increased drainage or bleeding, Incisional warmth, Pus or odor from incision, Fever   Is the patient/caregiver able to teach back steps to recovery at home? Set small, achievable goals for return to baseline health, Rest and rebuild strength, gradually increase activity   If the patient is a current smoker, are they able to teach back resources for cessation? Not a smoker   Is the patient/caregiver able to teach back the hierarchy of who to call/visit for symptoms/problems? PCP, Specialist, Home health nurse, Urgent Care, ED, 911 Yes   Additional teach back comments Patient states she is getting her 100 grams of protein in per day. She states she has a red line across her abdomen and sides from post surgery. Patient states she thinks it is from tape possibly. She has showered several times to cleanse the area. No blisters or open areas, no itching.   TCM call completed? Yes   Wrap up additional comments Patient has POST op f/u appt with Dr. Tracy Augustine on 10/10/24 at 1:00 PM. Patient declines making a [facility] f/u appt at time of TCM call. She states she will see surgeon for now.   Call end time 1213   Would this patient benefit from a Referral to [facility] Social Work? No   Is the patient interested in additional calls from an ambulatory ? No            Natasha Black RN    10/7/2024, 12:14 EDT

## 2024-10-07 NOTE — CASE MANAGEMENT/SOCIAL WORK
Case Management Discharge Note      Final Note: Pt discharged home with family via private car. Pt is independent, no needs at discharge.         Selected Continued Care - Discharged on 10/4/2024 Admission date: 10/3/2024 - Discharge disposition: Home or Self Care      Destination    No services have been selected for the patient.                Durable Medical Equipment    No services have been selected for the patient.                Dialysis/Infusion    No services have been selected for the patient.                Home Medical Care    No services have been selected for the patient.                Therapy    No services have been selected for the patient.                Community Resources    No services have been selected for the patient.                Community & DME    No services have been selected for the patient.                         Final Discharge Disposition Code: 01 - home or self-care

## 2024-10-10 ENCOUNTER — OFFICE VISIT (OUTPATIENT)
Dept: BARIATRICS/WEIGHT MGMT | Facility: CLINIC | Age: 54
End: 2024-10-10
Payer: COMMERCIAL

## 2024-10-10 VITALS
OXYGEN SATURATION: 98 % | TEMPERATURE: 97.5 F | HEART RATE: 80 BPM | HEIGHT: 69 IN | BODY MASS INDEX: 43.4 KG/M2 | SYSTOLIC BLOOD PRESSURE: 142 MMHG | WEIGHT: 293 LBS | DIASTOLIC BLOOD PRESSURE: 84 MMHG | RESPIRATION RATE: 18 BRPM

## 2024-10-10 DIAGNOSIS — E66.01 OBESITY, CLASS III, BMI 40-49.9 (MORBID OBESITY): Primary | ICD-10-CM

## 2024-10-10 DIAGNOSIS — K21.9 GASTROESOPHAGEAL REFLUX DISEASE, UNSPECIFIED WHETHER ESOPHAGITIS PRESENT: ICD-10-CM

## 2024-10-10 DIAGNOSIS — L24.0 IRRITANT CONTACT DERMATITIS DUE TO DETERGENT: ICD-10-CM

## 2024-10-10 RX ORDER — ACETAMINOPHEN 500 MG
500 TABLET ORAL EVERY 6 HOURS PRN
COMMUNITY

## 2024-10-10 RX ORDER — URSODIOL 300 MG/1
300 CAPSULE ORAL 2 TIMES DAILY
Qty: 60 CAPSULE | Refills: 5 | Status: SHIPPED | OUTPATIENT
Start: 2024-10-10 | End: 2025-04-08

## 2024-10-10 NOTE — PROGRESS NOTES
[facility] Bariatric Surgery  [address]  [address]  [facility] [address] [phone]      Patient Name:  Caity Hammond.  :  1970      Date of Visit: 10/10/2024      Reason for Visit:  POD #7    HPI:  Caity Hammond is a 54 y.o. female s/p RSG/HHR (PQ) 10/3/24.    C/o rash on abdomen, up to inframammary fold, on both sides of her abdomen.  Suspects chlorhexidine playing a role.  Very itchy, took a Benadryl.      Had some warmth and pain where IV was on right wrist.    Doing well.  No issues/concerns. Denies dysphagia, reflux, nausea, vomiting, and abdominal pain.  Tolerating diet progression - on stage 1.  Getting +g prot/day.  Drinking 64 fluid oz/day.  She has purchased vitamins as directed in manual and has a vitamin planner.  On Omeprazole .  Holding ASA , NSAIDs , and Steroids.  Ambulating.     Constipation?: no       Presurgery weight: 315 pounds.  Today's weight is 136 kg (299 lb) pounds, today's  Body mass index is 44.8 kg/m²., and weight loss since surgery is 16 pounds.       Path reviewed with patient:  DIAGNOSIS:   STOMACH, PARTIAL GASTRECTOMY:   Benign gastric mucosa with mild chronic gastritis   Negative for dysplasia or carcinoma     Past Medical History:   Diagnosis Date    Dyspepsia     Fatigue     GERD (gastroesophageal reflux disease)     prn PPI, no recent eval    Gout     occasional flare up - right big toe    History of transfusion     for bleeding ulcer    Hyperlipidemia     Morbid obesity     Peptic ulceration     UGI bleed @age 6    Prediabetes     RLS (restless legs syndrome)      Past Surgical History:   Procedure Laterality Date     SECTION       SECTION WITH TUBAL  2005    GASTRIC SLEEVE LAPAROSCOPIC N/A 10/3/2024    Procedure: GASTRIC SLEEVE WITH HIATAL HERNIA LAPAROSCOPIC WITH DAVINCI ROBOT;  Surgeon: Tracy Augustine MD;  Location: [facility];  Service: Robotics - DaVinci;  Laterality: N/A;    WISDOM  "TOOTH EXTRACTION Bilateral 1990     Outpatient Medications Marked as Taking for the 10/10/24 encounter (Office Visit) with Tracy Augustine MD   Medication Sig Dispense Refill    acetaminophen (TYLENOL) 500 MG tablet Take 1 tablet by mouth Every 6 (Six) Hours As Needed for Mild Pain.      B Complex Vitamins (vitamin b complex) capsule capsule Take  by mouth Daily.      Berberine Chloride (BERBERINE HCI PO) Take  by mouth.      doxylamine (UNISOM) 25 MG tablet Take 1 tablet by mouth At Night As Needed for Sleep.      loratadine (Claritin) 10 MG tablet Take 1 tablet by mouth Daily.      magnesium oxide (MAG-OX) 400 MG tablet Take 1 tablet by mouth Daily.      multivitamin with minerals tablet tablet Take 1 tablet by mouth Daily.      omeprazole (priLOSEC) 40 MG capsule Take 1 capsule by mouth Daily for 60 days. 60 capsule 0    Vitamin D-Vitamin K (D3 + K2) 125-100 MCG capsule Take  by mouth.       Allergies   Allergen Reactions    Latex Itching and Rash     noticed mouth and throat itchy following dental procedure       Social History     Socioeconomic History    Marital status:    Tobacco Use    Smoking status: Never    Smokeless tobacco: Never   Vaping Use    Vaping status: Never Used   Substance and Sexual Activity    Alcohol use: Never    Drug use: Never    Sexual activity: Defer     Partners: Male     Birth control/protection: None     Comment:  only       /84 (BP Location: Left arm, Patient Position: Sitting, Cuff Size: Large Adult)   Pulse 80   Temp 97.5 °F (36.4 °C) (Infrared)   Resp 18   Ht 174 cm (68.5\")   Wt 136 kg (299 lb)   SpO2 98%   BMI 44.80 kg/m²   Physical Exam  Constitutional:       General: She is not in acute distress.     Appearance: She is well-developed. She is not diaphoretic.   HENT:      Head: Normocephalic and atraumatic.      Mouth/Throat:      Pharynx: No oropharyngeal exudate.   Eyes:      Conjunctiva/sclera: Conjunctivae normal.      Pupils: Pupils are " equal, round, and reactive to light.   Pulmonary:      Effort: Pulmonary effort is normal. No respiratory distress.   Abdominal:      General: There is no distension.      Palpations: Abdomen is soft.      Comments: Incisions are well-healing without induration, erythema, fluctuance, or drainage.      Apparent contact dermatitis from chlorhexidine prep.  No fungal component noted to rash.   Skin:     General: Skin is warm and dry.      Coloration: Skin is not pale.   Neurological:      Mental Status: She is alert and oriented to person, place, and time.      Cranial Nerves: No cranial nerve deficit.   Psychiatric:         Behavior: Behavior normal.         Thought Content: Thought content normal.           Assessment:   POD # 7             ICD-10-CM ICD-9-CM   1. Obesity, Class III, BMI 40-49.9 (morbid obesity)  E66.01 278.01   2. Gastroesophageal reflux disease, unspecified whether esophagitis present  K21.9 530.81   3. Irritant contact dermatitis due to detergent  L24.0 692.0       Plan:  Doing well. Continue to advance diet per manual.  Increase protein intake to 100g/day.  Actigall Rx given.  Increase exercise/activity as tolerated.  Reviewed lifting restrictions, nothing >25 lbs x 2 more weeks.  Continue vitamins.  Continue PPI.  Continue to avoid ASA/NSAIDs/Steroids x 6 weeks postop.  Call w/ problems/concerns.    Re: rash, continue ceterizine, topical cortisone or benadryl, ok for po benadryl and add Pepcid 20 mg bid.    The patient was instructed to follow up in 3 weeks, sooner if needed.     Tracy Augustine MD

## 2024-11-08 ENCOUNTER — OFFICE VISIT (OUTPATIENT)
Dept: BARIATRICS/WEIGHT MGMT | Facility: CLINIC | Age: 54
End: 2024-11-08
Payer: COMMERCIAL

## 2024-11-08 VITALS
OXYGEN SATURATION: 94 % | TEMPERATURE: 97.3 F | WEIGHT: 289.2 LBS | HEART RATE: 79 BPM | HEIGHT: 69 IN | BODY MASS INDEX: 42.83 KG/M2 | DIASTOLIC BLOOD PRESSURE: 80 MMHG | RESPIRATION RATE: 18 BRPM | SYSTOLIC BLOOD PRESSURE: 118 MMHG

## 2024-11-08 DIAGNOSIS — R53.83 FATIGUE, UNSPECIFIED TYPE: ICD-10-CM

## 2024-11-08 DIAGNOSIS — R79.0 ABNORMAL BLOOD LEVEL OF IRON: ICD-10-CM

## 2024-11-08 DIAGNOSIS — K91.2 POSTGASTRECTOMY MALABSORPTION: ICD-10-CM

## 2024-11-08 DIAGNOSIS — Z98.84 S/P BARIATRIC SURGERY: ICD-10-CM

## 2024-11-08 DIAGNOSIS — E55.9 VITAMIN D DEFICIENCY: ICD-10-CM

## 2024-11-08 DIAGNOSIS — Z90.3 POSTGASTRECTOMY MALABSORPTION: ICD-10-CM

## 2024-11-08 DIAGNOSIS — E66.01 OBESITY, CLASS III, BMI 40-49.9 (MORBID OBESITY): Primary | ICD-10-CM

## 2024-11-08 NOTE — PROGRESS NOTES
[facility] Bariatric Surgery  [address]  [address]  [facility] [address]  [phone]      Patient Name:  Caity Hammond  :  1970      Date of Visit: 2024      Reason for Visit:  1 month postop    HPI:  Caity Hammond is a 54 y.o. female s/p robotic LSG/HHR 10/3/24 Dr. Augustine    Doing well.  No issues/concerns.  Denies dysphagia, reflux, nausea, vomiting, abdominal pain, and diarrhea.  Some consipation- takes miralax prn. Tolerating diet progression - on stage 5. Canned chicken, ground beef, eggs. Drinks protein shake in morning.  Eats 3 meals, 1 snack. Getting 90-100g prot/day.  Drinking 40-50 fluid oz/day. Drinks water.  Taking MVI, B12, B1, Calcium, Vit D, iron, Vit C, and magnesium .  On Omeprazole  and Actigall .  Still holding ASA , NSAIDs , and Steroids.  Physical activity: walking 4x/wk, joined [facility].     Presurgery weight:  315 pounds. Today's weight is 131 kg (289 lb 3.2 oz) pounds, today's Body mass index is 43.33 kg/m²., and weight loss since surgery is 26 pounds.       Past Medical History:   Diagnosis Date    Dyspepsia     Fatigue     GERD (gastroesophageal reflux disease)     prn PPI, no recent eval    Gout     occasional flare up - right big toe    History of transfusion     for bleeding ulcer    Hyperlipidemia     Morbid obesity     Peptic ulceration     UGI bleed @age 6    Prediabetes     RLS (restless legs syndrome)      Past Surgical History:   Procedure Laterality Date     SECTION  2004     SECTION WITH TUBAL  2005    GASTRIC SLEEVE LAPAROSCOPIC N/A 10/3/2024    Procedure: GASTRIC SLEEVE WITH HIATAL HERNIA LAPAROSCOPIC WITH GiggleINCI ROBOT;  Surgeon: Tracy Augustine MD;  Location: [facility];  Service: Robotics - DaVinci;  Laterality: N/A;    WISDOM TOOTH EXTRACTION Bilateral      Outpatient Medications Marked as Taking for the 24 encounter (Office Visit) with Maria Alejandra Forman APRN  "  Medication Sig Dispense Refill    acetaminophen (TYLENOL) 500 MG tablet Take 1 tablet by mouth Every 6 (Six) Hours As Needed for Mild Pain.      B Complex Vitamins (vitamin b complex) capsule capsule Take  by mouth Daily.      Calcium Citrate-Vitamin D (CALCIUM CITRATE CHEWY BITE PO) Take 1 each by mouth 2 (Two) Times a Day.      doxylamine (UNISOM) 25 MG tablet Take 1 tablet by mouth At Night As Needed for Sleep.      loratadine (Claritin) 10 MG tablet Take 1 tablet by mouth Daily.      magnesium oxide (MAG-OX) 400 MG tablet Take 1 tablet by mouth Daily.      multivitamin with minerals tablet tablet Take 1 tablet by mouth Daily.      omeprazole (priLOSEC) 40 MG capsule Take 1 capsule by mouth Daily for 60 days. 60 capsule 0    ursodiol (ACTIGALL) 300 MG capsule Take 1 capsule by mouth 2 (Two) Times a Day for 180 days. 60 capsule 5    Vitamin D-Vitamin K (D3 + K2) 125-100 MCG capsule Take  by mouth.       Allergies   Allergen Reactions    Latex Itching and Rash     noticed mouth and throat itchy following dental procedure    Chlorhexidine Gluconate Rash     Contact dermatitis       Social History     Socioeconomic History    Marital status:    Tobacco Use    Smoking status: Never    Smokeless tobacco: Never   Vaping Use    Vaping status: Never Used   Substance and Sexual Activity    Alcohol use: Never    Drug use: Never    Sexual activity: Defer     Partners: Male     Birth control/protection: None     Comment:  only     Social History     Social History Narrative    Lives in [place], [place].   w/ 2 adult kids.  Works for [place] [employer] -  / .        /80 (BP Location: Left arm, Patient Position: Sitting, Cuff Size: Large Adult)   Pulse 79   Temp 97.3 °F (36.3 °C) (Temporal)   Resp 18   Ht 174 cm (68.5\")   Wt 131 kg (289 lb 3.2 oz)   SpO2 94%   BMI 43.33 kg/m²     Physical Exam  Constitutional:       Appearance: She is well-developed. She is not ill-appearing. "   Eyes:      General: No scleral icterus.  Cardiovascular:      Rate and Rhythm: Normal rate.   Pulmonary:      Effort: Pulmonary effort is normal.   Abdominal:      Palpations: Abdomen is soft.      Tenderness: There is no abdominal tenderness.      Comments: incisions healing well   Musculoskeletal:         General: Normal range of motion.   Skin:     General: Skin is warm and dry.      Findings: No rash.   Neurological:      Mental Status: She is alert.   Psychiatric:         Behavior: Behavior is cooperative.         Judgment: Judgment normal.           Assessment:  1 month s/p robotic LSG/HHR 10/3/24 Dr. Augustine      ICD-10-CM ICD-9-CM   1. Obesity, Class III, BMI 40-49.9 (morbid obesity)  E66.01 278.01   2. Postgastrectomy malabsorption  K91.2 579.3    Z90.3    3. S/P bariatric surgery  Z98.84 V45.86   4. Fatigue, unspecified type  R53.83 780.79   5. Abnormal blood level of iron  R79.0 790.6   6. Vitamin D deficiency  E55.9 268.9              Plan:  Doing well.  Continue to advance diet per manual.  Continue protein 100g/day.  Continue routine physical activity.  Routine bariatric labs ordered.  Continue vitamins w/ adjustments pending lab results.  Continue PPI, actigall.  Continue to avoid ASA/NSAIDS/tobacco x 6 weeks postop, steroids x 8 weeks postop.   Call w/ problems/concerns.    The patient was instructed to follow up in 2 months, sooner if needed.

## 2024-11-15 LAB
25(OH)D3+25(OH)D2 SERPL-MCNC: 60.8 NG/ML (ref 30–100)
ALBUMIN SERPL-MCNC: 4.4 G/DL (ref 3.8–4.9)
ALP SERPL-CCNC: 83 IU/L (ref 44–121)
ALT SERPL-CCNC: 11 IU/L (ref 0–32)
AST SERPL-CCNC: 17 IU/L (ref 0–40)
BASOPHILS # BLD AUTO: 0 X10E3/UL (ref 0–0.2)
BASOPHILS NFR BLD AUTO: 0 %
BILIRUB SERPL-MCNC: 0.5 MG/DL (ref 0–1.2)
BUN SERPL-MCNC: 18 MG/DL (ref 6–24)
BUN/CREAT SERPL: 27 (ref 9–23)
CALCIUM SERPL-MCNC: 9.9 MG/DL (ref 8.7–10.2)
CHLORIDE SERPL-SCNC: 106 MMOL/L (ref 96–106)
CO2 SERPL-SCNC: 21 MMOL/L (ref 20–29)
CREAT SERPL-MCNC: 0.66 MG/DL (ref 0.57–1)
EGFRCR SERPLBLD CKD-EPI 2021: 105 ML/MIN/1.73
EOSINOPHIL # BLD AUTO: 0.1 X10E3/UL (ref 0–0.4)
EOSINOPHIL NFR BLD AUTO: 1 %
ERYTHROCYTE [DISTWIDTH] IN BLOOD BY AUTOMATED COUNT: 16.1 % (ref 11.7–15.4)
FERRITIN SERPL-MCNC: 35 NG/ML (ref 15–150)
FOLATE SERPL-MCNC: 13.8 NG/ML
GLOBULIN SER CALC-MCNC: 2.3 G/DL (ref 1.5–4.5)
GLUCOSE SERPL-MCNC: 102 MG/DL (ref 70–99)
HCT VFR BLD AUTO: 46 % (ref 34–46.6)
HGB BLD-MCNC: 14.3 G/DL (ref 11.1–15.9)
IMM GRANULOCYTES # BLD AUTO: 0 X10E3/UL (ref 0–0.1)
IMM GRANULOCYTES NFR BLD AUTO: 0 %
IRON SERPL-MCNC: 50 UG/DL (ref 27–159)
LYMPHOCYTES # BLD AUTO: 1.6 X10E3/UL (ref 0.7–3.1)
LYMPHOCYTES NFR BLD AUTO: 21 %
MCH RBC QN AUTO: 26.1 PG (ref 26.6–33)
MCHC RBC AUTO-ENTMCNC: 31.1 G/DL (ref 31.5–35.7)
MCV RBC AUTO: 84 FL (ref 79–97)
METHYLMALONATE SERPL-SCNC: 211 NMOL/L (ref 0–378)
MONOCYTES # BLD AUTO: 0.6 X10E3/UL (ref 0.1–0.9)
MONOCYTES NFR BLD AUTO: 8 %
NEUTROPHILS # BLD AUTO: 5.3 X10E3/UL (ref 1.4–7)
NEUTROPHILS NFR BLD AUTO: 70 %
PLATELET # BLD AUTO: 227 X10E3/UL (ref 150–450)
POTASSIUM SERPL-SCNC: 4.4 MMOL/L (ref 3.5–5.2)
PREALB SERPL-MCNC: 19 MG/DL (ref 10–36)
PROT SERPL-MCNC: 6.7 G/DL (ref 6–8.5)
RBC # BLD AUTO: 5.47 X10E6/UL (ref 3.77–5.28)
SODIUM SERPL-SCNC: 142 MMOL/L (ref 134–144)
VIT B1 BLD-SCNC: 217 NMOL/L (ref 66.5–200)
WBC # BLD AUTO: 7.7 X10E3/UL (ref 3.4–10.8)

## 2024-12-09 RX ORDER — OMEPRAZOLE 40 MG/1
40 CAPSULE, DELAYED RELEASE ORAL DAILY
Qty: 60 CAPSULE | Refills: 0 | Status: SHIPPED | OUTPATIENT
Start: 2024-12-09

## 2025-01-10 ENCOUNTER — TELEMEDICINE (OUTPATIENT)
Dept: BARIATRICS/WEIGHT MGMT | Facility: CLINIC | Age: 55
End: 2025-01-10
Payer: COMMERCIAL

## 2025-01-10 VITALS — BODY MASS INDEX: 41.27 KG/M2 | WEIGHT: 275.4 LBS

## 2025-01-10 DIAGNOSIS — Z90.3 POSTGASTRECTOMY MALABSORPTION: ICD-10-CM

## 2025-01-10 DIAGNOSIS — R53.83 FATIGUE, UNSPECIFIED TYPE: ICD-10-CM

## 2025-01-10 DIAGNOSIS — Z51.81 THERAPEUTIC DRUG MONITORING: ICD-10-CM

## 2025-01-10 DIAGNOSIS — G25.81 RLS (RESTLESS LEGS SYNDROME): ICD-10-CM

## 2025-01-10 DIAGNOSIS — K91.2 POSTGASTRECTOMY MALABSORPTION: ICD-10-CM

## 2025-01-10 DIAGNOSIS — E66.01 OBESITY, CLASS III, BMI 40-49.9 (MORBID OBESITY): Primary | ICD-10-CM

## 2025-01-10 DIAGNOSIS — Z13.21 MALNUTRITION SCREEN: ICD-10-CM

## 2025-01-10 DIAGNOSIS — E78.2 MIXED HYPERLIPIDEMIA: Chronic | ICD-10-CM

## 2025-01-10 DIAGNOSIS — Z13.0 SCREENING, IRON DEFICIENCY ANEMIA: ICD-10-CM

## 2025-01-10 DIAGNOSIS — K21.9 GASTROESOPHAGEAL REFLUX DISEASE, UNSPECIFIED WHETHER ESOPHAGITIS PRESENT: ICD-10-CM

## 2025-01-10 DIAGNOSIS — R73.03 PREDIABETES: Chronic | ICD-10-CM

## 2025-01-10 DIAGNOSIS — E55.9 HYPOVITAMINOSIS D: ICD-10-CM

## 2025-01-10 NOTE — PROGRESS NOTES
[facility] Bariatric Surgery  [address]  [address]  [facility] [address]  [phone]        Patient Name:  Caity Hammond.  :  1970      Date of Visit: 1/10/2025      Reason for Visit:   3 months postop     HPI: Caity Hammond is a 54 y.o. female s/p RSG/HHR (PQ) 10/3/24      Reports week of [place] eating was a little off but she did not gain weight this year.    Doing well.  No issues/concerns. Denies dysphagia, reflux, nausea, vomiting, and abdominal pain.  Getting  g prot/day.  Drinking 45-55 fluid oz/day.  Struggles to get liquids b/c it's hard to drink cold liquids and she dislikes coffee/tea.      1 month labs revealed  no vitamin deficiencies.     Taking MVI, B12, B1, Calcium, Vit D, iron, and Vit C.  Sometimes misses B1 and calcium.  On Omeprazole  and Actigall .  Exercise: walking, less since weather is bad.      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  Calories 3441-4525.     Presurgery weight: 315 pounds.  Today's weight is 125 kg (275 lb 6.4 oz) pounds, today's  Body mass index is 41.27 kg/m²., and weight loss since surgery is 40 pounds.      Past Medical History:   Diagnosis Date    Dyspepsia     Fatigue     GERD (gastroesophageal reflux disease)     prn PPI, no recent eval    Gout     occasional flare up - right big toe    History of transfusion     for bleeding ulcer    Hyperlipidemia     Morbid obesity     Peptic ulceration     UGI bleed @age 6    Prediabetes     RLS (restless legs syndrome)      Past Surgical History:   Procedure Laterality Date     SECTION  2004     SECTION WITH TUBAL  2005    GASTRIC SLEEVE LAPAROSCOPIC N/A 10/3/2024    Procedure: GASTRIC SLEEVE WITH HIATAL HERNIA LAPAROSCOPIC WITH DAVINCI ROBOT;  Surgeon: Tracy Augustine MD;  Location: [facility];  Service: Robotics - DaVinci;  Laterality: N/A;    WISDOM TOOTH EXTRACTION Bilateral      Outpatient Medications Marked as Taking for the 1/10/25 encounter (Telemedicine)  with Tracy Augustine MD   Medication Sig Dispense Refill    B Complex Vitamins (vitamin b complex) capsule capsule Take  by mouth Daily.      Calcium Citrate-Vitamin D (CALCIUM CITRATE CHEWY BITE PO) Take 1 each by mouth 2 (Two) Times a Day.      doxylamine (UNISOM) 25 MG tablet Take 1 tablet by mouth At Night As Needed for Sleep.      loratadine (Claritin) 10 MG tablet Take 1 tablet by mouth Daily.      magnesium oxide (MAG-OX) 400 MG tablet Take 1 tablet by mouth Daily.      multivitamin with minerals tablet tablet Take 1 tablet by mouth Daily.      omeprazole (priLOSEC) 40 MG capsule Take 1 capsule by mouth once daily 60 capsule 0    ursodiol (ACTIGALL) 300 MG capsule Take 1 capsule by mouth 2 (Two) Times a Day for 180 days. 60 capsule 5    Vitamin D-Vitamin K (D3 + K2) 125-100 MCG capsule Take  by mouth.         Allergies   Allergen Reactions    Latex Itching and Rash     noticed mouth and throat itchy following dental procedure    Chlorhexidine Gluconate Rash     Contact dermatitis       Social History     Socioeconomic History    Marital status:    Tobacco Use    Smoking status: Never    Smokeless tobacco: Never   Vaping Use    Vaping status: Never Used   Substance and Sexual Activity    Alcohol use: Never    Drug use: Never    Sexual activity: Defer     Partners: Male     Birth control/protection: None     Comment:  only       Wt 125 kg (275 lb 6.4 oz)   BMI 41.27 kg/m²     Physical Exam  Constitutional:       General: She is not in acute distress.     Appearance: Normal appearance. She is not ill-appearing.   HENT:      Head: Normocephalic and atraumatic.      Nose: Nose normal.   Eyes:      General: No scleral icterus.     Extraocular Movements: Extraocular movements intact.      Conjunctiva/sclera: Conjunctivae normal.      Pupils: Pupils are equal, round, and reactive to light.   Pulmonary:      Effort: Pulmonary effort is normal. No respiratory distress.   Skin:     Coloration: Skin  is not pale.   Neurological:      Mental Status: She is alert and oriented to person, place, and time.   Psychiatric:         Mood and Affect: Mood normal.         Behavior: Behavior normal.           Assessment:  3 months s/p RSG/HHR (PQ) 10/3/24      ICD-10-CM ICD-9-CM   1. Obesity, Class III, BMI 40-49.9 (morbid obesity)  E66.01 278.01   2. Fatigue, unspecified type  R53.83 780.79   3. Postgastrectomy malabsorption  K91.2 579.3    Z90.3    4. Therapeutic drug monitoring  Z51.81 V58.83   5. Malnutrition screen  Z13.21 V77.2   6. Screening, iron deficiency anemia  Z13.0 V78.0   7. Hypovitaminosis D  E55.9 268.9   8. Gastroesophageal reflux disease, unspecified whether esophagitis present  K21.9 530.81   9. Mixed hyperlipidemia  E78.2 272.2   10. Prediabetes  R73.03 790.29   11. RLS (restless legs syndrome)  G25.81 333.94                Plan:  Doing well. Continue w/ good food choices and healthy habits.  Continue protein >70g/day.  Continue routine exercise.  Routine bariatric labs ordered.  Continue vitamins w/ adjustments pending lab results.  Call w/ problems/concerns.     Refer to exercise physiologist.    The patient was instructed to follow up in 3 months, sooner if needed.    I spent 30 minutes caring for Caity on this date of service. This time includes time spent by me in the following activities: preparing for the visit, reviewing tests, performing a medically appropriate examination and/or evaluation, counseling and educating the patient/family/caregiver, referring and communicating with other health care professionals, documenting information in the medical record, independently interpreting results and communicating that information with the patient/family/caregiver, care coordination, ordering medications, ordering test(s), ordering procedure(s), obtaining a separately obtained history, and reviewing a separately obtained history    The patient presents today for telehealth service.  The service was  conducted via HIPAA compliant Epic video platform.    The provider is located at her home address.  The patient is located at home in [place] and stated that they are in a secure environment for the session.  The patient's condition being diagnosed/treated is appropriate for telemedicine.       The use of a video visit has been reviewed with the patient and verbal informed consent has been obtained.       Tracy Augustine MD

## 2025-01-27 ENCOUNTER — OFFICE VISIT (OUTPATIENT)
Dept: OTHER | Facility: HOSPITAL | Age: 55
End: 2025-01-27

## 2025-01-27 NOTE — PROGRESS NOTES
Exercise Education Note - Initial Visit    Patient: Caity Hammond       Date: 1/27/2025    Patient was seen via telehealth (video not available) for initial exercise education consult. Approximately 60 min was spent preparing, discussing patient concerns, goal setting, and documenting patient encounter.     For/Concerns: pt is 4 mths post bariatric surgery and states the weight loss is slowing down. She is ready to learn how to incorporate resistance exercise into her program so that she can improve her weight management.    Current Exercise Abilities/Experience: pt walks 3-5 days per week for 30 min in 10-15 min increments at a moderate pace.      Equipment / Facilities Available:treadmill, outdoors, 3 & 5 lb dumbbells at home    Barriers to Exercise:works an hour away and weekends are busy. No Injuries, aches / pains, health concerns or  mobility issues shared    Summary of Education / Achievement Strategies: pt currently walks at breaks at work but she is working from home on Mon and Friday and in addition to her walking believes she can do strength exercise after work at 4:30 2 days per week. We discussed and reviewed appropriate exercises to begin with, she was provided a handout and corresponding video demos of each exercise. We discussed how going forward she may want to consider resistance bands to take to her office if needed and be open to heavier weights.  We discussed the principals of building muscle and strength for weight mgt, the importance of protein, and using enough resistance to stimulate change.   Goals set at today's visit include:  Walk for 30 min 5 days per week at a moderate intensity  2 days per week strength exercise, start with 2 sets of 12 reps  30g of protein post strength training    Follow up scheduled for 1 month.     Jesse Story  1/27/2025  13:17 EST

## 2025-02-03 LAB
25(OH)D3+25(OH)D2 SERPL-MCNC: 60.5 NG/ML (ref 30–100)
ALBUMIN SERPL-MCNC: 4.4 G/DL (ref 3.8–4.9)
ALP SERPL-CCNC: 91 IU/L (ref 44–121)
ALT SERPL-CCNC: 15 IU/L (ref 0–32)
AST SERPL-CCNC: 18 IU/L (ref 0–40)
BASOPHILS # BLD AUTO: 0 X10E3/UL (ref 0–0.2)
BASOPHILS NFR BLD AUTO: 1 %
BILIRUB SERPL-MCNC: 0.5 MG/DL (ref 0–1.2)
BUN SERPL-MCNC: 22 MG/DL (ref 6–24)
BUN/CREAT SERPL: 28 (ref 9–23)
CALCIUM SERPL-MCNC: 9.5 MG/DL (ref 8.7–10.2)
CHLORIDE SERPL-SCNC: 105 MMOL/L (ref 96–106)
CO2 SERPL-SCNC: 22 MMOL/L (ref 20–29)
CREAT SERPL-MCNC: 0.78 MG/DL (ref 0.57–1)
EGFRCR SERPLBLD CKD-EPI 2021: 91 ML/MIN/1.73
EOSINOPHIL # BLD AUTO: 0.1 X10E3/UL (ref 0–0.4)
EOSINOPHIL NFR BLD AUTO: 1 %
ERYTHROCYTE [DISTWIDTH] IN BLOOD BY AUTOMATED COUNT: 13.9 % (ref 11.7–15.4)
FERRITIN SERPL-MCNC: 20 NG/ML (ref 15–150)
FOLATE SERPL-MCNC: 17.6 NG/ML
GLOBULIN SER CALC-MCNC: 2.5 G/DL (ref 1.5–4.5)
GLUCOSE SERPL-MCNC: 95 MG/DL (ref 70–99)
HCT VFR BLD AUTO: 40.9 % (ref 34–46.6)
HGB BLD-MCNC: 13 G/DL (ref 11.1–15.9)
IMM GRANULOCYTES # BLD AUTO: 0 X10E3/UL (ref 0–0.1)
IMM GRANULOCYTES NFR BLD AUTO: 0 %
IRON SERPL-MCNC: 49 UG/DL (ref 27–159)
LYMPHOCYTES # BLD AUTO: 2.1 X10E3/UL (ref 0.7–3.1)
LYMPHOCYTES NFR BLD AUTO: 31 %
MCH RBC QN AUTO: 26.7 PG (ref 26.6–33)
MCHC RBC AUTO-ENTMCNC: 31.8 G/DL (ref 31.5–35.7)
MCV RBC AUTO: 84 FL (ref 79–97)
METHYLMALONATE SERPL-SCNC: 178 NMOL/L (ref 0–378)
MONOCYTES # BLD AUTO: 0.6 X10E3/UL (ref 0.1–0.9)
MONOCYTES NFR BLD AUTO: 8 %
NEUTROPHILS # BLD AUTO: 4 X10E3/UL (ref 1.4–7)
NEUTROPHILS NFR BLD AUTO: 59 %
PLATELET # BLD AUTO: 277 X10E3/UL (ref 150–450)
POTASSIUM SERPL-SCNC: 4.3 MMOL/L (ref 3.5–5.2)
PREALB SERPL-MCNC: 20 MG/DL (ref 10–36)
PROT SERPL-MCNC: 6.9 G/DL (ref 6–8.5)
RBC # BLD AUTO: 4.86 X10E6/UL (ref 3.77–5.28)
SODIUM SERPL-SCNC: 142 MMOL/L (ref 134–144)
VIT B1 BLD-SCNC: 143.1 NMOL/L (ref 66.5–200)
WBC # BLD AUTO: 6.8 X10E3/UL (ref 3.4–10.8)

## 2025-02-24 ENCOUNTER — OFFICE VISIT (OUTPATIENT)
Dept: OTHER | Facility: HOSPITAL | Age: 55
End: 2025-02-24

## 2025-02-24 NOTE — PROGRESS NOTES
Exercise Education Note - Follow Up    Patient: Caity Hammond       Date: 2/24/2025    Patient was seen via telehealth for follow up exercise education consult. Approximately 30 min was spent preparing, counseling, and discussing next steps.     Goal(s) Achievement Status: Pt was just able to start her program last week and achieved two strength workouts and walking on her treadmill during breaks at work. She feels she will be successful this week as well. Pt shares she has seen a small nudge in her weight loss as well this week , where previously it had stalled.    Barriers Identified or Additional Concerns: pt states covid went through all her family members so she felt unwell for about 10 days and then they were out of work for a week due to snow.     Next Steps: She's feeling better but notes that she will have to start reporting back to the office daily Monday thru Friday and has an 1 hour drive each way. She gets home around 545 - 6pm and thinks it will be more challenging to get her strength exercise. She states between Christianity and grocery shopping it only leaves 2 evenings that she does not have anything scheduled. We discussed how 2 day is sufficient but how if she just gets one she can get it in on Saturday or reviewed strategies for fitting it in at her desk at work. No changes to the program at this time she will gradually increase the resistance used for the current routine. Follow up scheduled for 2 months.         Jesse Story  2/24/2025  11:38 EST

## 2025-04-16 ENCOUNTER — OFFICE VISIT (OUTPATIENT)
Dept: BARIATRICS/WEIGHT MGMT | Facility: CLINIC | Age: 55
End: 2025-04-16
Payer: COMMERCIAL

## 2025-04-16 ENCOUNTER — DOCUMENTATION (OUTPATIENT)
Dept: BARIATRICS/WEIGHT MGMT | Facility: CLINIC | Age: 55
End: 2025-04-16
Payer: COMMERCIAL

## 2025-04-16 VITALS
HEART RATE: 71 BPM | WEIGHT: 261 LBS | DIASTOLIC BLOOD PRESSURE: 64 MMHG | HEIGHT: 69 IN | OXYGEN SATURATION: 98 % | SYSTOLIC BLOOD PRESSURE: 110 MMHG | RESPIRATION RATE: 18 BRPM | TEMPERATURE: 97.7 F | BODY MASS INDEX: 38.66 KG/M2

## 2025-04-16 DIAGNOSIS — R73.03 PREDIABETES: ICD-10-CM

## 2025-04-16 DIAGNOSIS — E66.812 OBESITY, CLASS II, BMI 35-39.9: Primary | ICD-10-CM

## 2025-04-16 DIAGNOSIS — Z13.21 MALNUTRITION SCREEN: ICD-10-CM

## 2025-04-16 DIAGNOSIS — R53.83 FATIGUE, UNSPECIFIED TYPE: ICD-10-CM

## 2025-04-16 DIAGNOSIS — E55.9 HYPOVITAMINOSIS D: ICD-10-CM

## 2025-04-16 DIAGNOSIS — Z51.81 THERAPEUTIC DRUG MONITORING: ICD-10-CM

## 2025-04-16 DIAGNOSIS — K91.2 POSTGASTRECTOMY MALABSORPTION: ICD-10-CM

## 2025-04-16 DIAGNOSIS — Z90.3 POSTGASTRECTOMY MALABSORPTION: ICD-10-CM

## 2025-04-16 DIAGNOSIS — Z13.0 SCREENING, IRON DEFICIENCY ANEMIA: ICD-10-CM

## 2025-04-16 DIAGNOSIS — E78.2 MIXED HYPERLIPIDEMIA: ICD-10-CM

## 2025-04-16 DIAGNOSIS — K21.9 GASTROESOPHAGEAL REFLUX DISEASE, UNSPECIFIED WHETHER ESOPHAGITIS PRESENT: ICD-10-CM

## 2025-04-16 RX ORDER — URSODIOL 300 MG/1
300 CAPSULE ORAL 2 TIMES DAILY
COMMUNITY

## 2025-04-16 NOTE — PROGRESS NOTES
[facility] Bariatric Surgery  [address]  [address]  [facility] [address] [phone]        Patient Name:  Caity Hammond.  :  1970      Date of Visit: 2025      Reason for Visit:   6 months postop     HPI: Caity Hammond is a 55 y.o. female s/p RSG/HHR (PQ) 10/3/24     Doing well.  No issues/concerns. Denies dysphagia, reflux, nausea, vomiting, and abdominal pain.  Getting 95-105g prot/day.  Drinking 48-64 fluid oz/day.  Last labs revealed  no vitamin deficiencies. Taking MVI, B12, Calcium, Vit D, and Vit C.  On Omeprazole  and Actigall .       Following with Jesse, started exercise regimen earlier this spring.  Had some distractions, household at COVID x 2 weeks (no exercise), stress at work also.    Has been stalled x 2 weeks.  Hopeful she is building muscle, as her clothing fits better.  Hunger has returned some.  Previously had to remind herself to eat.      Diet recall for yesterday:  B: 10 oz fair life milk 2%, 1 scoop of vikas cookies and cream protein shake  Snack: 1 oz cheddar cheese  L: homemade chicken salad (canned chicken, 2 tbsp cottage cheese., 2 tsp miracle chip, 3 tbsp dill relish),  helga lettuce leaves, 5 cherry tomatoes  S: 3 oz small fried pork tenderloin, 1/2 c. Mashed potatoes, 1/3 c. Green beans, pork dripping gravy, fried apples, 1 small buttermilk biscuit.  Snack: none.    May have Oikos instead for breakfast.    Yesterday was different, had birthday dinner for family member.  Dinner would more typically be chili, chicken stir aguirre, or meat/veg/starch.  Sometimes eats protein ice cream from [employer].  Drinks only water with sugar-free flavoring also.      Walks 30 min daily at work.  Hunter: 1781-7053/day, including on exercise days.    Presurgery weight: 315 pounds.  Today's weight is 118 kg (261 lb) pounds, today's  Body mass index is 39.11 kg/m²., and weight loss since surgery is 54 pounds.      Past Medical History:   Diagnosis Date     Dyspepsia     Fatigue     GERD (gastroesophageal reflux disease)     prn PPI, no recent eval    Gout     occasional flare up - right big toe    History of transfusion     for bleeding ulcer    Hyperlipidemia     Morbid obesity     Peptic ulceration     UGI bleed @age 6    Prediabetes     RLS (restless legs syndrome)      Past Surgical History:   Procedure Laterality Date     SECTION       SECTION WITH TUBAL  2005    GASTRIC SLEEVE LAPAROSCOPIC N/A 10/3/2024    Procedure: GASTRIC SLEEVE WITH HIATAL HERNIA LAPAROSCOPIC WITH DAVINCI ROBOT;  Surgeon: Tracy Augustine MD;  Location: [facility];  Service: Robotics - DaVinci;  Laterality: N/A;    WISDOM TOOTH EXTRACTION Bilateral      Outpatient Medications Marked as Taking for the 25 encounter (Office Visit) with Tracy Augustine MD   Medication Sig Dispense Refill    B Complex Vitamins (vitamin b complex) capsule capsule Take  by mouth Daily.      Calcium Citrate-Vitamin D (CALCIUM CITRATE CHEWY BITE PO) Take 1 each by mouth 2 (Two) Times a Day.      doxylamine (UNISOM) 25 MG tablet Take 1 tablet by mouth At Night As Needed for Sleep.      loratadine (Claritin) 10 MG tablet Take 1 tablet by mouth Daily.      magnesium oxide (MAG-OX) 400 MG tablet Take 1 tablet by mouth Daily.      multivitamin with minerals tablet tablet Take 1 tablet by mouth Daily.      omeprazole (priLOSEC) 40 MG capsule Take 1 capsule by mouth once daily 60 capsule 0    ursodiol (ACTIGALL) 300 MG capsule Take 1 capsule by mouth 2 (Two) Times a Day.      Vitamin D-Vitamin K (D3 + K2) 125-100 MCG capsule Take  by mouth.         Allergies   Allergen Reactions    Latex Itching and Rash     noticed mouth and throat itchy following dental procedure    Chlorhexidine Gluconate Rash     Contact dermatitis       Social History     Socioeconomic History    Marital status:    Tobacco Use    Smoking status: Never     Passive exposure: Never    Smokeless  "tobacco: Never   Vaping Use    Vaping status: Never Used   Substance and Sexual Activity    Alcohol use: Never    Drug use: Never    Sexual activity: Defer     Partners: Male     Birth control/protection: None     Comment:  only       /64   Pulse 71   Temp 97.7 °F (36.5 °C) (Temporal)   Resp 18   Ht 174 cm (68.5\")   Wt 118 kg (261 lb)   SpO2 98%   BMI 39.11 kg/m²     Physical Exam  Constitutional:       General: She is not in acute distress.     Appearance: She is well-developed. She is not diaphoretic.   HENT:      Head: Normocephalic and atraumatic.      Mouth/Throat:      Pharynx: No oropharyngeal exudate.   Eyes:      Conjunctiva/sclera: Conjunctivae normal.      Pupils: Pupils are equal, round, and reactive to light.   Pulmonary:      Effort: Pulmonary effort is normal. No respiratory distress.   Abdominal:      General: There is no distension.      Palpations: Abdomen is soft.   Skin:     General: Skin is warm and dry.      Coloration: Skin is not pale.   Neurological:      Mental Status: She is alert and oriented to person, place, and time.      Cranial Nerves: No cranial nerve deficit.   Psychiatric:         Behavior: Behavior normal.         Thought Content: Thought content normal.           Assessment:  6 months s/p RSG/HHR (PQ) 10/3/24     ICD-10-CM ICD-9-CM   1. Obesity, Class II, BMI 35-39.9  E66.812 278.00   2. Fatigue, unspecified type  R53.83 780.79   3. Postgastrectomy malabsorption  K91.2 579.3    Z90.3    4. Therapeutic drug monitoring  Z51.81 V58.83   5. Malnutrition screen  Z13.21 V77.2   6. Screening, iron deficiency anemia  Z13.0 V78.0   7. Hypovitaminosis D  E55.9 268.9   8. Gastroesophageal reflux disease, unspecified whether esophagitis present  K21.9 530.81   9. Mixed hyperlipidemia  E78.2 272.2   10. Prediabetes  R73.03 790.29              Plan:  Doing well. Continue w/ good food choices and healthy habits.  Continue protein >70g/day.  Continue routine exercise.  " Routine bariatric labs ordered.  Continue vitamins w/ adjustments pending lab results.  Call w/ problems/concerns.     Weight loss has slowed some, will have her speak to Xochitl.  Keep working with Jesse, will f/u in few months.  Will walk 4 days, and 2 days of strength during the week.  Discussed increasing calories to 2740-6600 on exercise days, 9173-4208 other days.      The patient was instructed to follow up in 3 months, sooner if needed.    I spent 30 minutes caring for Caity on this date of service. This time includes time spent by me in the following activities: preparing for the visit, reviewing tests, performing a medically appropriate examination and/or evaluation, counseling and educating the patient/family/caregiver, referring and communicating with other health care professionals, documenting information in the medical record, independently interpreting results and communicating that information with the patient/family/caregiver, care coordination, ordering medications, ordering test(s), ordering procedure(s), obtaining a separately obtained history, and reviewing a separately obtained history      Tracy Augustine MD

## 2025-04-16 NOTE — PROGRESS NOTES
Bariatric Nutrition Consult     Name: Caity Hammond   YOB: 1970   Age: 55 y.o.   MRN: [id number]    Consult Date:4/16/25    Surgery: sleeve                                  Date of surgery:10/3/24    Patient is 6 months post op.     Height: 174cm            Weight: 261lbs     Pre Op weight: 315lbs              Current BMI: 39.11    Weight change:  lost 54lbs, had 2 plateaus lasting 2 weeks or more      Diet history reveals:  tracks in baritastic faithfully  Appears to have a range of cals from 7610-3197 and similar swings in macros  Lots of stress at work, sickness, does not sleep (takes unisom), post menopausal  Interestingly when ate different foods higher in cals over the wkend she lost weight  May be hypocaloric and too low in carbs        Exercise/activity:  walking, resistance bands (working with Jesse)    Main bariatric nutrition principles discussed and explained and queries answered. Encouraged patient to focus on       Increase cals 4460-1060, 5176-8063 if not losing and when exercising  Send food journal for review  Encouraged pt ++          Xochitl Alexander RD, LD

## 2025-04-20 LAB
25(OH)D3+25(OH)D2 SERPL-MCNC: 42 NG/ML (ref 30–100)
ALBUMIN SERPL-MCNC: 4.4 G/DL (ref 3.8–4.9)
ALP SERPL-CCNC: 94 IU/L (ref 44–121)
ALT SERPL-CCNC: 12 IU/L (ref 0–32)
AST SERPL-CCNC: 16 IU/L (ref 0–40)
BASOPHILS # BLD AUTO: 0 X10E3/UL (ref 0–0.2)
BASOPHILS NFR BLD AUTO: 0 %
BILIRUB SERPL-MCNC: 0.5 MG/DL (ref 0–1.2)
BUN SERPL-MCNC: 19 MG/DL (ref 6–24)
BUN/CREAT SERPL: 29 (ref 9–23)
CALCIUM SERPL-MCNC: 9.9 MG/DL (ref 8.7–10.2)
CHLORIDE SERPL-SCNC: 104 MMOL/L (ref 96–106)
CO2 SERPL-SCNC: 22 MMOL/L (ref 20–29)
CREAT SERPL-MCNC: 0.66 MG/DL (ref 0.57–1)
EGFRCR SERPLBLD CKD-EPI 2021: 104 ML/MIN/1.73
EOSINOPHIL # BLD AUTO: 0.1 X10E3/UL (ref 0–0.4)
EOSINOPHIL NFR BLD AUTO: 1 %
ERYTHROCYTE [DISTWIDTH] IN BLOOD BY AUTOMATED COUNT: 12.9 % (ref 11.7–15.4)
FERRITIN SERPL-MCNC: 12 NG/ML (ref 15–150)
FOLATE SERPL-MCNC: >20 NG/ML
GLOBULIN SER CALC-MCNC: 2.4 G/DL (ref 1.5–4.5)
GLUCOSE SERPL-MCNC: 83 MG/DL (ref 70–99)
HCT VFR BLD AUTO: 43 % (ref 34–46.6)
HGB BLD-MCNC: 13.5 G/DL (ref 11.1–15.9)
IMM GRANULOCYTES # BLD AUTO: 0 X10E3/UL (ref 0–0.1)
IMM GRANULOCYTES NFR BLD AUTO: 0 %
IRON SERPL-MCNC: 40 UG/DL (ref 27–159)
LYMPHOCYTES # BLD AUTO: 2.1 X10E3/UL (ref 0.7–3.1)
LYMPHOCYTES NFR BLD AUTO: 27 %
MCH RBC QN AUTO: 26.3 PG (ref 26.6–33)
MCHC RBC AUTO-ENTMCNC: 31.4 G/DL (ref 31.5–35.7)
MCV RBC AUTO: 84 FL (ref 79–97)
METHYLMALONATE SERPL-SCNC: 150 NMOL/L (ref 0–378)
MONOCYTES # BLD AUTO: 0.5 X10E3/UL (ref 0.1–0.9)
MONOCYTES NFR BLD AUTO: 6 %
NEUTROPHILS # BLD AUTO: 5 X10E3/UL (ref 1.4–7)
NEUTROPHILS NFR BLD AUTO: 66 %
PLATELET # BLD AUTO: 251 X10E3/UL (ref 150–450)
POTASSIUM SERPL-SCNC: 4.6 MMOL/L (ref 3.5–5.2)
PREALB SERPL-MCNC: 22 MG/DL (ref 10–36)
PROT SERPL-MCNC: 6.8 G/DL (ref 6–8.5)
RBC # BLD AUTO: 5.14 X10E6/UL (ref 3.77–5.28)
SODIUM SERPL-SCNC: 142 MMOL/L (ref 134–144)
VIT B1 BLD-SCNC: 155.9 NMOL/L (ref 66.5–200)
WBC # BLD AUTO: 7.8 X10E3/UL (ref 3.4–10.8)

## 2025-04-28 ENCOUNTER — OFFICE VISIT (OUTPATIENT)
Dept: OTHER | Facility: HOSPITAL | Age: 55
End: 2025-04-28

## 2025-04-28 NOTE — CONSULTS
"Exercise Education Note - Follow Up    Patient: Caity Hammond       Date:4/28/2025    Patient was seen for follow up exercise education via telehealth. Approximately 30 min was spent discussing patient progress, barriers/concerns, and next steps.     Goal(s) Achievement Status: pt has been successful walking daily at work on her lunch hour and strength training at home 2 days per week, for 2 sets of 12 reps of total body exercise. She reports slow weight loss by at her last appointment there was no muscle loss.     Barriers Identified or Additional Concerns: She would like to increase her leg strength and muscle tone, she reports being \"knock knee\" and wanted to know if she could do anything to improve that.     Next Steps: regarding pt concerns we discussed that if the knee structure was due to bone structure not much we can do but if it is muscular in nature then we can strengthen the hip add/abd and overall leg strength to see some slight improvements. Patient was encourage to increase her strength to 3 sets of 12 reps on total body day and to add a third day of lower body/hip exercises to work on that weakness. New exercises with video demos were emailed. Pt was also encourage to improve her water intake. Follow up schedule for 2 months.         Jesse Story  4/28/2028  13:17 EDT    "

## 2025-04-29 ENCOUNTER — RESULTS FOLLOW-UP (OUTPATIENT)
Dept: BARIATRICS/WEIGHT MGMT | Facility: CLINIC | Age: 55
End: 2025-04-29
Payer: COMMERCIAL

## 2025-08-15 ENCOUNTER — OFFICE VISIT (OUTPATIENT)
Dept: BARIATRICS/WEIGHT MGMT | Facility: CLINIC | Age: 55
End: 2025-08-15
Payer: COMMERCIAL

## 2025-08-15 ENCOUNTER — DOCUMENTATION (OUTPATIENT)
Dept: BARIATRICS/WEIGHT MGMT | Facility: CLINIC | Age: 55
End: 2025-08-15
Payer: COMMERCIAL

## 2025-08-15 VITALS
HEART RATE: 77 BPM | HEIGHT: 69 IN | OXYGEN SATURATION: 98 % | BODY MASS INDEX: 37.77 KG/M2 | DIASTOLIC BLOOD PRESSURE: 78 MMHG | TEMPERATURE: 98 F | WEIGHT: 255 LBS | SYSTOLIC BLOOD PRESSURE: 112 MMHG | RESPIRATION RATE: 18 BRPM

## 2025-08-15 DIAGNOSIS — E66.812 OBESITY, CLASS II, BMI 35-39.9: Primary | ICD-10-CM

## 2025-08-15 DIAGNOSIS — Z90.3 POSTGASTRECTOMY MALABSORPTION: ICD-10-CM

## 2025-08-15 DIAGNOSIS — E55.9 HYPOVITAMINOSIS D: ICD-10-CM

## 2025-08-15 DIAGNOSIS — R53.83 FATIGUE, UNSPECIFIED TYPE: ICD-10-CM

## 2025-08-15 DIAGNOSIS — K91.2 POSTGASTRECTOMY MALABSORPTION: ICD-10-CM

## 2025-08-15 DIAGNOSIS — Z13.0 SCREENING, IRON DEFICIENCY ANEMIA: ICD-10-CM

## 2025-08-15 DIAGNOSIS — Z13.21 MALNUTRITION SCREEN: ICD-10-CM

## 2025-08-15 DIAGNOSIS — Z51.81 THERAPEUTIC DRUG MONITORING: ICD-10-CM

## 2025-08-15 LAB
AMPHET+METHAMPHET UR QL: NEGATIVE
AMPHETAMINES UR QL: NEGATIVE
BARBITURATES UR QL SCN: NEGATIVE
BENZODIAZ UR QL SCN: NEGATIVE
BUPRENORPHINE SERPL-MCNC: NEGATIVE NG/ML
CANNABINOIDS SERPL QL: NEGATIVE
COCAINE UR QL: NEGATIVE
MDMA UR QL SCN: NEGATIVE
METHADONE UR QL SCN: NEGATIVE
MORPHINE/OPIATES SCREEN, URINE: NEGATIVE
OXYCODONE UR QL SCN: NEGATIVE
PCP UR QL SCN: NEGATIVE

## 2025-08-15 RX ORDER — PHENTERMINE HYDROCHLORIDE 37.5 MG/1
37.5 TABLET ORAL
Qty: 28 TABLET | Refills: 0 | Status: SHIPPED | OUTPATIENT
Start: 2025-08-15

## 2025-08-15 RX ORDER — TOPIRAMATE 25 MG/1
TABLET, FILM COATED ORAL
Qty: 60 TABLET | Refills: 0 | Status: SHIPPED | OUTPATIENT
Start: 2025-08-15

## 2025-08-15 RX ORDER — FERROUS FUMARATE 324(106)MG
1 TABLET ORAL DAILY
Qty: 90 TABLET | Refills: 3 | Status: SHIPPED | OUTPATIENT
Start: 2025-08-15

## 2025-08-23 LAB
25(OH)D3+25(OH)D2 SERPL-MCNC: 46 NG/ML (ref 30–100)
ALBUMIN SERPL-MCNC: 4.4 G/DL (ref 3.8–4.9)
ALP SERPL-CCNC: 83 IU/L (ref 44–121)
ALT SERPL-CCNC: 11 IU/L (ref 0–32)
AST SERPL-CCNC: 14 IU/L (ref 0–40)
BASOPHILS # BLD AUTO: 0 X10E3/UL (ref 0–0.2)
BASOPHILS NFR BLD AUTO: 1 %
BILIRUB SERPL-MCNC: 0.7 MG/DL (ref 0–1.2)
BUN SERPL-MCNC: 12 MG/DL (ref 6–24)
BUN/CREAT SERPL: 19 (ref 9–23)
CALCIUM SERPL-MCNC: 9.7 MG/DL (ref 8.7–10.2)
CHLORIDE SERPL-SCNC: 105 MMOL/L (ref 96–106)
CO2 SERPL-SCNC: 23 MMOL/L (ref 20–29)
CREAT SERPL-MCNC: 0.62 MG/DL (ref 0.57–1)
EGFRCR SERPLBLD CKD-EPI 2021: 106 ML/MIN/1.73
EOSINOPHIL # BLD AUTO: 0.1 X10E3/UL (ref 0–0.4)
EOSINOPHIL NFR BLD AUTO: 2 %
ERYTHROCYTE [DISTWIDTH] IN BLOOD BY AUTOMATED COUNT: 15 % (ref 11.7–15.4)
FERRITIN SERPL-MCNC: 21 NG/ML (ref 15–150)
FOLATE SERPL-MCNC: >20 NG/ML
GLOBULIN SER CALC-MCNC: 2.4 G/DL (ref 1.5–4.5)
GLUCOSE SERPL-MCNC: 88 MG/DL (ref 70–99)
HCT VFR BLD AUTO: 47.9 % (ref 34–46.6)
HGB BLD-MCNC: 14 G/DL (ref 11.1–15.9)
IMM GRANULOCYTES # BLD AUTO: 0 X10E3/UL (ref 0–0.1)
IMM GRANULOCYTES NFR BLD AUTO: 0 %
IRON SERPL-MCNC: 61 UG/DL (ref 27–159)
LYMPHOCYTES # BLD AUTO: 2 X10E3/UL (ref 0.7–3.1)
LYMPHOCYTES NFR BLD AUTO: 35 %
MCH RBC QN AUTO: 25.4 PG (ref 26.6–33)
MCHC RBC AUTO-ENTMCNC: 29.2 G/DL (ref 31.5–35.7)
MCV RBC AUTO: 87 FL (ref 79–97)
METHYLMALONATE SERPL-SCNC: 136 NMOL/L (ref 0–378)
MONOCYTES # BLD AUTO: 0.5 X10E3/UL (ref 0.1–0.9)
MONOCYTES NFR BLD AUTO: 8 %
NEUTROPHILS # BLD AUTO: 3.1 X10E3/UL (ref 1.4–7)
NEUTROPHILS NFR BLD AUTO: 54 %
PLATELET # BLD AUTO: 239 X10E3/UL (ref 150–450)
POTASSIUM SERPL-SCNC: 4.9 MMOL/L (ref 3.5–5.2)
PREALB SERPL-MCNC: 22 MG/DL (ref 10–36)
PROT SERPL-MCNC: 6.8 G/DL (ref 6–8.5)
RBC # BLD AUTO: 5.51 X10E6/UL (ref 3.77–5.28)
SODIUM SERPL-SCNC: 142 MMOL/L (ref 134–144)
VIT B1 BLD-SCNC: 350.3 NMOL/L (ref 66.5–200)
WBC # BLD AUTO: 5.8 X10E3/UL (ref 3.4–10.8)

## (undated) DEVICE — SYR SLPTP 30CC

## (undated) DEVICE — GOWN,NON-REINFORCED,SIRUS,SET IN SLV,XL: Brand: MEDLINE

## (undated) DEVICE — BARIATRIC KIT: Brand: MEDLINE INDUSTRIES, INC.

## (undated) DEVICE — VESSEL SEALER EXTEND: Brand: ENDOWRIST

## (undated) DEVICE — CADIERE FORCEPS: Brand: ENDOWRIST

## (undated) DEVICE — THE DEVICE IS A DISPOSABLE, LIGATURE PASSING, SUTURING APPARATUS AND NEEDLE GUIDE FOR THE ABDOMINAL WALL WHICH IS NON-POWERED, HAND-HELD, AND HAND-MANIPULATED,INTENDED TO BE USED IN VARIOUS GENERAL SURGICAL PROCEDURES. THE DEVICE INCLUDES A LIGATURE CARRIER PATHWAY, NEEDLE GUIDE, TWO NEEDLES, REFERENCE PLANE T-BAR, AND A GUIDEWIRE. THE HANDLE OF THE DEVICE PROVIDES TWO DIAMETRICALLY OPPOSED ENCLOSED GUIDEWAYS FOR THE ADVANCEMENT AND RETRACTION OF THE NEEDLES UNDER MANUAL CONTROL OF A PLUNGER LOCATED AT THE PROXIMAL END OF THE DEVICE.AS PART OF THE M-CLOSE CONVENIENCE KIT, A NERVE BLOCK NEEDLE IS INCLUDED FOR THE ADMINISTRATION OF LOCAL ANESTHETIC AGENTS TO PROVIDE REGIONAL AND LOCAL ANESTHESIA.  A TELFA ANTIMICROBIAL, NON-ADHERENT PAD IS ALSO PROVIDED IN THE KIT FOR USE AS A PRIMARY DRESSING FOR THE SURGICAL INCISION.: Brand: M-CLOSE KIT

## (undated) DEVICE — BALN BOUGIE STD/TPR 38F

## (undated) DEVICE — MARKR PEN SURG VISIMARK GENTIAN VIOLET

## (undated) DEVICE — COLUMN DRAPE

## (undated) DEVICE — SOL IRR NACL 0.9PCT 1000ML

## (undated) DEVICE — PENROSE DRAIN 18 X .5" SILICONE: Brand: MEDLINE

## (undated) DEVICE — TROCARS: Brand: KII® OPTICAL ACCESS SYSTEM

## (undated) DEVICE — ST TBG PNEUMOCLEAR EVAC SMOKE HIFLO

## (undated) DEVICE — SEALANT WND FIBRIN TISSEEL PREFIL/SYR/PRIMAFZ 10ML

## (undated) DEVICE — SEALANT WND FIBRIN TISSEEL PREFIL/SYR/PRIMAFZ 4ML

## (undated) DEVICE — SOL IRR H2O BTL 1000ML STRL

## (undated) DEVICE — SPNG VERSALON 4X4 4PLY NONSTRL LF BG/200

## (undated) DEVICE — ARM DRAPE

## (undated) DEVICE — PATIENT RETURN ELECTRODE, SINGLE-USE, CONTACT QUALITY MONITORING, ADULT, WITH 9FT CORD, FOR PATIENTS WEIGING OVER 33LBS. (15KG): Brand: MEGADYNE

## (undated) DEVICE — UNDRPD COMFRT GLD DRYPAD 36X57IN

## (undated) DEVICE — THE BITE BLOCK MAXI, LATEX FREE STRAP IS USED TO PROTECT THE ENDOSCOPE INSERTION TUBE FROM BEING BITTEN BY THE PATIENT.

## (undated) DEVICE — ST TBG CONN PNEUMOCLEAR EVAC SMOKE HEAT/HUMID

## (undated) DEVICE — APL DUPLOSPRAYER MIS 40CM

## (undated) DEVICE — VLV SXN AIR/H2O ORCAPOD3 1P/U STRL

## (undated) DEVICE — SYR LUER SLPTP 50ML

## (undated) DEVICE — LAPAROSCOPIC DISSECTOR: Brand: DEROYAL

## (undated) DEVICE — GLV SURG SENSICARE W/ALOE PF LF 6.5 STRL

## (undated) DEVICE — BG TRNSPRT SCOPEVALET RED

## (undated) DEVICE — HDRST POSTN SLOTTED A/

## (undated) DEVICE — STAPLER 60: Brand: SUREFORM

## (undated) DEVICE — SHT AIR TRANSFR COMFRT GLIDE LAT 40X80IN

## (undated) DEVICE — SEAL

## (undated) DEVICE — GOWN SURG ORBIS LVL3 LG STRL

## (undated) DEVICE — SPNG ENDO BEDSIDE TUB ENZYM

## (undated) DEVICE — BLANKT WARM UPPR/BDY ARM/OUT 57X196CM

## (undated) DEVICE — ADHS SKIN PREMIERPRO EXOFIN TOPICAL HI/VISC .5ML

## (undated) DEVICE — GLV SURG SENSICARE PI PF LF 7 GRN STRL

## (undated) DEVICE — CANNULA SEAL

## (undated) DEVICE — BLADELESS OBTURATOR, LONG: Brand: WECK VISTA

## (undated) DEVICE — TIP-UP FENESTRATED GRASPER: Brand: ENDOWRIST